# Patient Record
Sex: FEMALE | Race: WHITE | NOT HISPANIC OR LATINO | Employment: UNEMPLOYED | URBAN - NONMETROPOLITAN AREA
[De-identification: names, ages, dates, MRNs, and addresses within clinical notes are randomized per-mention and may not be internally consistent; named-entity substitution may affect disease eponyms.]

---

## 2024-03-06 ENCOUNTER — OUTSIDE PLACE OF SERVICE (OUTPATIENT)
Dept: FAMILY MEDICINE | Facility: CLINIC | Age: 64
End: 2024-03-06
Payer: MEDICAID

## 2024-03-06 PROCEDURE — 99223 1ST HOSP IP/OBS HIGH 75: CPT | Mod: ,,, | Performed by: FAMILY MEDICINE

## 2024-03-07 ENCOUNTER — OUTSIDE PLACE OF SERVICE (OUTPATIENT)
Dept: FAMILY MEDICINE | Facility: CLINIC | Age: 64
End: 2024-03-07
Payer: MEDICAID

## 2024-03-07 ENCOUNTER — HOSPITAL ENCOUNTER (INPATIENT)
Facility: HOSPITAL | Age: 64
LOS: 4 days | Discharge: ANOTHER HOSPITAL | DRG: 644 | End: 2024-03-11
Admitting: FAMILY MEDICINE
Payer: MEDICAID

## 2024-03-07 ENCOUNTER — HOSPITAL ENCOUNTER (OUTPATIENT)
Dept: RADIOLOGY | Facility: HOSPITAL | Age: 64
Discharge: HOME | DRG: 644 | End: 2024-03-07
Attending: PSYCHIATRY & NEUROLOGY
Payer: MEDICAID

## 2024-03-07 DIAGNOSIS — E03.5 MYXEDEMA COMA: Primary | ICD-10-CM

## 2024-03-07 DIAGNOSIS — E87.6 HYPOKALEMIA: ICD-10-CM

## 2024-03-07 DIAGNOSIS — I95.0 IDIOPATHIC HYPOTENSION: ICD-10-CM

## 2024-03-07 DIAGNOSIS — R41.82 ALTERED MENTAL STATUS: ICD-10-CM

## 2024-03-07 DIAGNOSIS — R40.1 STUPOR: ICD-10-CM

## 2024-03-07 DIAGNOSIS — R41.0 CONFUSION: ICD-10-CM

## 2024-03-07 DIAGNOSIS — R00.0 TACHYCARDIA: ICD-10-CM

## 2024-03-07 PROCEDURE — 93005 ELECTROCARDIOGRAM TRACING: CPT

## 2024-03-07 PROCEDURE — 93010 ELECTROCARDIOGRAM REPORT: CPT | Mod: ,,, | Performed by: INTERNAL MEDICINE

## 2024-03-07 PROCEDURE — 25500020 PHARM REV CODE 255

## 2024-03-07 PROCEDURE — 70460 CT HEAD/BRAIN W/DYE: CPT | Mod: TC

## 2024-03-07 PROCEDURE — 99231 SBSQ HOSP IP/OBS SF/LOW 25: CPT | Mod: ,,, | Performed by: FAMILY MEDICINE

## 2024-03-07 PROCEDURE — 20000000 HC ICU ROOM

## 2024-03-07 PROCEDURE — 99285 EMERGENCY DEPT VISIT HI MDM: CPT | Mod: 25

## 2024-03-07 RX ADMIN — IOHEXOL 50 ML: 300 INJECTION, SOLUTION INTRAVENOUS at 03:03

## 2024-03-08 PROBLEM — R40.1 STUPOR: Status: ACTIVE | Noted: 2024-03-08

## 2024-03-08 PROBLEM — E87.6 HYPOKALEMIA: Status: ACTIVE | Noted: 2024-03-08

## 2024-03-08 PROBLEM — I95.0 IDIOPATHIC HYPOTENSION: Status: ACTIVE | Noted: 2024-03-08

## 2024-03-08 PROBLEM — R41.0 CONFUSION: Status: ACTIVE | Noted: 2024-03-08

## 2024-03-08 PROBLEM — E03.5 MYXEDEMA COMA: Status: ACTIVE | Noted: 2024-03-08

## 2024-03-08 LAB
ALBUMIN SERPL-MCNC: 3.3 G/DL (ref 3.4–5)
ALBUMIN SERPL-MCNC: 3.5 G/DL (ref 3.4–5)
ALBUMIN/GLOB SERPL: 0.8 RATIO
ALBUMIN/GLOB SERPL: 0.8 RATIO
ALP SERPL-CCNC: 124 UNIT/L (ref 50–144)
ALP SERPL-CCNC: 142 UNIT/L (ref 50–144)
ALT SERPL-CCNC: 66 UNIT/L (ref 1–45)
ALT SERPL-CCNC: 72 UNIT/L (ref 1–45)
AMPHET UR QL SCN: NEGATIVE
ANION GAP SERPL CALC-SCNC: 3 MEQ/L (ref 2–13)
ANION GAP SERPL CALC-SCNC: 4 MEQ/L (ref 2–13)
APAP SERPL-MCNC: <10 UG/ML (ref 10–30)
APPEARANCE UR: CLEAR
AST SERPL-CCNC: 149 UNIT/L (ref 14–36)
AST SERPL-CCNC: 169 UNIT/L (ref 14–36)
AV INDEX (PROSTH): 0.68
AV MEAN GRADIENT: 6 MMHG
AV PEAK GRADIENT: 11 MMHG
AV VALVE AREA BY VELOCITY RATIO: 2.28 CM²
AV VALVE AREA: 2.49 CM²
AV VELOCITY RATIO: 0.62
BARBITURATE SCN PRESENT UR: NEGATIVE
BASE EXCESS BLD CALC-SCNC: 6.8 MMOL/L (ref -2–2)
BASOPHILS # BLD AUTO: 0.05 X10(3)/MCL (ref 0.01–0.08)
BASOPHILS # BLD AUTO: 0.06 X10(3)/MCL (ref 0.01–0.08)
BASOPHILS NFR BLD AUTO: 0.4 % (ref 0.1–1.2)
BASOPHILS NFR BLD AUTO: 0.5 % (ref 0.1–1.2)
BENZODIAZ UR QL SCN: NEGATIVE
BILIRUB SERPL-MCNC: 0.8 MG/DL (ref 0–1)
BILIRUB SERPL-MCNC: 1.1 MG/DL (ref 0–1)
BILIRUB UR QL STRIP.AUTO: ABNORMAL
BLOOD GAS SAMPLE TYPE (OHS): ABNORMAL
BNP BLD-MCNC: 584 PG/ML (ref 0–124.9)
BSA FOR ECHO PROCEDURE: 2.23 M2
BUN SERPL-MCNC: 15 MG/DL (ref 7–20)
BUN SERPL-MCNC: 16 MG/DL (ref 7–20)
CALCIUM SERPL-MCNC: 8.7 MG/DL (ref 8.4–10.2)
CALCIUM SERPL-MCNC: 8.7 MG/DL (ref 8.4–10.2)
CANNABINOIDS UR QL SCN: NEGATIVE
CHLORIDE SERPL-SCNC: 103 MMOL/L (ref 98–110)
CHLORIDE SERPL-SCNC: 104 MMOL/L (ref 98–110)
CO2 SERPL-SCNC: 35 MMOL/L (ref 21–32)
CO2 SERPL-SCNC: 36 MMOL/L (ref 21–32)
COCAINE UR QL SCN: NEGATIVE
COHGB MFR BLDA: 1.1 % (ref 0–1.5)
COLOR UR AUTO: YELLOW
CORTIS 1H P CHAL SERPL-SCNC: 32.5 UG/DL
CORTIS 30M P CHAL SERPL-SCNC: 28 UG/DL
CORTIS SERPL-SCNC: 17.2 UG/DL
CREAT SERPL-MCNC: 0.75 MG/DL (ref 0.66–1.25)
CREAT SERPL-MCNC: 0.86 MG/DL (ref 0.66–1.25)
CREAT/UREA NIT SERPL: 17 (ref 12–20)
CREAT/UREA NIT SERPL: 21 (ref 12–20)
CV ECHO LV RWT: 0.47 CM
DOP CALC AO PEAK VEL: 1.67 M/S
DOP CALC AO VTI: 29.3 CM
DOP CALC LVOT AREA: 3.7 CM2
DOP CALC LVOT DIAMETER: 2.16 CM
DOP CALC LVOT PEAK VEL: 1.04 M/S
DOP CALC LVOT STROKE VOLUME: 72.88 CM3
DOP CALC MV VTI: 16.7 CM
DOP CALCLVOT PEAK VEL VTI: 19.9 CM
E WAVE DECELERATION TIME: 90 MSEC
E/A RATIO: 0.7
E/E' RATIO: 7.67 M/S
ECHO LV POSTERIOR WALL: 1.17 CM (ref 0.6–1.1)
EOSINOPHIL # BLD AUTO: 0.16 X10(3)/MCL (ref 0.04–0.36)
EOSINOPHIL # BLD AUTO: 0.16 X10(3)/MCL (ref 0.04–0.36)
EOSINOPHIL NFR BLD AUTO: 1.4 % (ref 0.7–7)
EOSINOPHIL NFR BLD AUTO: 1.5 % (ref 0.7–7)
ERYTHROCYTE [DISTWIDTH] IN BLOOD BY AUTOMATED COUNT: 15.3 % (ref 11–14.5)
ERYTHROCYTE [DISTWIDTH] IN BLOOD BY AUTOMATED COUNT: 15.9 % (ref 11–14.5)
ETHANOL BLD-MCNC: <0.01 G/DL
ETHANOL SERPL-MCNC: <10 MG/DL
FRACTIONAL SHORTENING: 28 % (ref 28–44)
GFR SERPLBLD CREATININE-BSD FMLA CKD-EPI: 76 MLS/MIN/1.73/M2
GFR SERPLBLD CREATININE-BSD FMLA CKD-EPI: 90 MLS/MIN/1.73/M2
GLOBULIN SER-MCNC: 4.1 GM/DL (ref 2–3.9)
GLOBULIN SER-MCNC: 4.4 GM/DL (ref 2–3.9)
GLUCOSE SERPL-MCNC: 89 MG/DL (ref 70–115)
GLUCOSE SERPL-MCNC: 93 MG/DL (ref 70–115)
GLUCOSE UR QL STRIP.AUTO: NEGATIVE
HCO3 BLDA-SCNC: 30.6 MMOL/L (ref 22–26)
HCT VFR BLD AUTO: 34.3 % (ref 36–48)
HCT VFR BLD AUTO: 35.9 % (ref 36–48)
HGB BLD-MCNC: 11.6 G/DL (ref 11.8–16)
HGB BLD-MCNC: 11.7 G/DL (ref 11.8–16)
IMM GRANULOCYTES # BLD AUTO: 0.06 X10(3)/MCL (ref 0–0.03)
IMM GRANULOCYTES # BLD AUTO: 0.06 X10(3)/MCL (ref 0–0.03)
IMM GRANULOCYTES NFR BLD AUTO: 0.5 % (ref 0–0.5)
IMM GRANULOCYTES NFR BLD AUTO: 0.5 % (ref 0–0.5)
INHALED O2 CONCENTRATION: 32 %
INTERVENTRICULAR SEPTUM: 1.19 CM (ref 0.6–1.1)
IP (OHS): 0 CMH2O
KETONES UR QL STRIP.AUTO: 15
LACTATE SERPL-SCNC: 1.2 MMOL/L (ref 0.4–2)
LEFT ATRIUM SIZE: 4.01 CM
LEFT INTERNAL DIMENSION IN SYSTOLE: 3.59 CM (ref 2.1–4)
LEFT VENTRICLE DIASTOLIC VOLUME INDEX: 56.04 ML/M2
LEFT VENTRICLE DIASTOLIC VOLUME: 118.8 ML
LEFT VENTRICLE MASS INDEX: 108 G/M2
LEFT VENTRICLE SYSTOLIC VOLUME INDEX: 25.5 ML/M2
LEFT VENTRICLE SYSTOLIC VOLUME: 54.1 ML
LEFT VENTRICULAR INTERNAL DIMENSION IN DIASTOLE: 5.01 CM (ref 3.5–6)
LEFT VENTRICULAR MASS: 229.04 G
LEUKOCYTE ESTERASE UR QL STRIP.AUTO: NEGATIVE
LPM (OHS): 3
LV LATERAL E/E' RATIO: 7.67 M/S
LV SEPTAL E/E' RATIO: 7.67 M/S
LVOT MG: 3.1 MMHG
LVOT MV: 0.87 CM/S
LYMPHOCYTES # BLD AUTO: 2.47 X10(3)/MCL (ref 1.16–3.74)
LYMPHOCYTES # BLD AUTO: 2.84 X10(3)/MCL (ref 1.16–3.74)
LYMPHOCYTES NFR BLD AUTO: 22.6 % (ref 20–55)
LYMPHOCYTES NFR BLD AUTO: 25.4 % (ref 20–55)
MAGNESIUM SERPL-MCNC: 2.2 MG/DL (ref 1.8–2.4)
MCH RBC QN AUTO: 31.4 PG (ref 27–34)
MCH RBC QN AUTO: 32.6 PG (ref 27–34)
MCHC RBC AUTO-ENTMCNC: 32.3 G/DL (ref 31–37)
MCHC RBC AUTO-ENTMCNC: 34.1 G/DL (ref 31–37)
MCV RBC AUTO: 95.5 FL (ref 79–99)
MCV RBC AUTO: 97.3 FL (ref 79–99)
METHADONE UR QL SCN: NEGATIVE
METHGB MFR BLDA: 0.8 % (ref 0–1.5)
MONOCYTES # BLD AUTO: 1.09 X10(3)/MCL (ref 0.24–0.36)
MONOCYTES # BLD AUTO: 1.25 X10(3)/MCL (ref 0.24–0.36)
MONOCYTES NFR BLD AUTO: 11.5 % (ref 4.7–12.5)
MONOCYTES NFR BLD AUTO: 9.7 % (ref 4.7–12.5)
MV MEAN GRADIENT: 4 MMHG
MV PEAK A VEL: 0.98 M/S
MV PEAK E VEL: 0.69 M/S
MV PEAK GRADIENT: 8 MMHG
MV VALVE AREA BY CONTINUITY EQUATION: 4.36 CM2
NEUTROPHILS # BLD AUTO: 6.91 X10(3)/MCL (ref 1.56–6.13)
NEUTROPHILS # BLD AUTO: 7 X10(3)/MCL (ref 1.56–6.13)
NEUTROPHILS NFR BLD AUTO: 62.6 % (ref 37–73)
NEUTROPHILS NFR BLD AUTO: 63.4 % (ref 37–73)
NITRITE UR QL STRIP.AUTO: NEGATIVE
NRBC BLD AUTO-RTO: 0 %
NRBC BLD AUTO-RTO: 0 %
OHS QRS DURATION: 102 MS
OHS QTC CALCULATION: 510 MS
OPIATES UR QL SCN: POSITIVE
PAW @ PEAK INSP FLOW SETTING VENT: 0 CMH20
PCO2 BLDA: 51.2 MMHG (ref 35–45)
PCP UR QL: NEGATIVE
PH BLDA: 7.41 [PH] (ref 7.35–7.45)
PH UR STRIP.AUTO: 7 [PH]
PH UR: 7 [PH] (ref 3–11)
PISA TR MAX VEL: 2.99 M/S
PLATELET # BLD AUTO: 286 X10(3)/MCL (ref 140–371)
PLATELET # BLD AUTO: 290 X10(3)/MCL (ref 140–371)
PMV BLD AUTO: 10.5 FL (ref 9.4–12.4)
PMV BLD AUTO: 11.1 FL (ref 9.4–12.4)
PO2 BLDA: 71.5 MMHG (ref 80–105)
POTASSIUM SERPL-SCNC: 3.2 MMOL/L (ref 3.5–5.1)
POTASSIUM SERPL-SCNC: 4.3 MMOL/L (ref 3.5–5.1)
PROT SERPL-MCNC: 7.4 GM/DL (ref 6.3–8.2)
PROT SERPL-MCNC: 7.9 GM/DL (ref 6.3–8.2)
PROT UR QL STRIP.AUTO: NEGATIVE
RA PRESSURE ESTIMATED: 3 MMHG
RBC # BLD AUTO: 3.59 X10(6)/MCL (ref 4–5.1)
RBC # BLD AUTO: 3.69 X10(6)/MCL (ref 4–5.1)
RBC UR QL AUTO: NEGATIVE
RV TB RVSP: 6 MMHG
SALICYLATES SERPL-MCNC: <1 MG/DL
SAO2 % BLDA: 94.7 % (ref 95–100)
SODIUM SERPL-SCNC: 142 MMOL/L (ref 135–145)
SODIUM SERPL-SCNC: 143 MMOL/L (ref 135–145)
SP GR UR STRIP.AUTO: <=1.005 (ref 1–1.03)
T3FREE SERPL-MCNC: <1.5 PG/ML (ref 1.58–3.91)
T3FREE SERPL-MCNC: <1.5 PG/ML (ref 1.58–3.91)
T4 FREE SERPL-MCNC: 1.45 NG/DL (ref 0.78–2.19)
T4 FREE SERPL-MCNC: 2.55 NG/DL (ref 0.78–2.19)
TDI LATERAL: 0.09 M/S
TDI SEPTAL: 0.09 M/S
TDI: 0.09 M/S
TR MAX PG: 36 MMHG
TRICUSPID ANNULAR PLANE SYSTOLIC EXCURSION: 1.8 CM
TROPONIN I SERPL-MCNC: <0.012 NG/ML (ref 0–0.03)
TSH SERPL-ACNC: 21 UIU/ML (ref 0.36–3.74)
TSH SERPL-ACNC: 23.8 UIU/ML (ref 0.36–3.74)
TV REST PULMONARY ARTERY PRESSURE: 39 MMHG
UROBILINOGEN UR STRIP-ACNC: 2
WBC # SPEC AUTO: 10.91 X10(3)/MCL (ref 4–11.5)
WBC # SPEC AUTO: 11.2 X10(3)/MCL (ref 4–11.5)
Z-SCORE OF LEFT VENTRICULAR DIMENSION IN END DIASTOLE: -2.91
Z-SCORE OF LEFT VENTRICULAR DIMENSION IN END SYSTOLE: -1.03

## 2024-03-08 PROCEDURE — 80179 DRUG ASSAY SALICYLATE: CPT

## 2024-03-08 PROCEDURE — 83735 ASSAY OF MAGNESIUM: CPT

## 2024-03-08 PROCEDURE — 82533 TOTAL CORTISOL: CPT | Performed by: INTERNAL MEDICINE

## 2024-03-08 PROCEDURE — 84439 ASSAY OF FREE THYROXINE: CPT

## 2024-03-08 PROCEDURE — 36600 WITHDRAWAL OF ARTERIAL BLOOD: CPT

## 2024-03-08 PROCEDURE — 25000003 PHARM REV CODE 250: Performed by: INTERNAL MEDICINE

## 2024-03-08 PROCEDURE — 94761 N-INVAS EAR/PLS OXIMETRY MLT: CPT | Mod: XB

## 2024-03-08 PROCEDURE — 36415 COLL VENOUS BLD VENIPUNCTURE: CPT

## 2024-03-08 PROCEDURE — 84484 ASSAY OF TROPONIN QUANT: CPT

## 2024-03-08 PROCEDURE — 99900035 HC TECH TIME PER 15 MIN (STAT)

## 2024-03-08 PROCEDURE — 63600175 PHARM REV CODE 636 W HCPCS: Performed by: FAMILY MEDICINE

## 2024-03-08 PROCEDURE — 80307 DRUG TEST PRSMV CHEM ANLYZR: CPT

## 2024-03-08 PROCEDURE — 82077 ASSAY SPEC XCP UR&BREATH IA: CPT

## 2024-03-08 PROCEDURE — 82390 ASSAY OF CERULOPLASMIN: CPT | Performed by: INTERNAL MEDICINE

## 2024-03-08 PROCEDURE — 83605 ASSAY OF LACTIC ACID: CPT

## 2024-03-08 PROCEDURE — 51702 INSERT TEMP BLADDER CATH: CPT

## 2024-03-08 PROCEDURE — 63600175 PHARM REV CODE 636 W HCPCS: Performed by: INTERNAL MEDICINE

## 2024-03-08 PROCEDURE — 84439 ASSAY OF FREE THYROXINE: CPT | Performed by: INTERNAL MEDICINE

## 2024-03-08 PROCEDURE — 81003 URINALYSIS AUTO W/O SCOPE: CPT | Mod: 59

## 2024-03-08 PROCEDURE — 85025 COMPLETE CBC W/AUTO DIFF WBC: CPT

## 2024-03-08 PROCEDURE — 84481 FREE ASSAY (FT-3): CPT | Performed by: INTERNAL MEDICINE

## 2024-03-08 PROCEDURE — 82533 TOTAL CORTISOL: CPT | Performed by: FAMILY MEDICINE

## 2024-03-08 PROCEDURE — 25000003 PHARM REV CODE 250: Performed by: FAMILY MEDICINE

## 2024-03-08 PROCEDURE — 25000003 PHARM REV CODE 250

## 2024-03-08 PROCEDURE — 80143 DRUG ASSAY ACETAMINOPHEN: CPT

## 2024-03-08 PROCEDURE — 20000000 HC ICU ROOM

## 2024-03-08 PROCEDURE — 80053 COMPREHEN METABOLIC PANEL: CPT | Performed by: INTERNAL MEDICINE

## 2024-03-08 PROCEDURE — 87040 BLOOD CULTURE FOR BACTERIA: CPT

## 2024-03-08 PROCEDURE — 96374 THER/PROPH/DIAG INJ IV PUSH: CPT

## 2024-03-08 PROCEDURE — 84443 ASSAY THYROID STIM HORMONE: CPT

## 2024-03-08 PROCEDURE — 83880 ASSAY OF NATRIURETIC PEPTIDE: CPT

## 2024-03-08 PROCEDURE — 84443 ASSAY THYROID STIM HORMONE: CPT | Performed by: INTERNAL MEDICINE

## 2024-03-08 PROCEDURE — 63600175 PHARM REV CODE 636 W HCPCS

## 2024-03-08 PROCEDURE — 84481 FREE ASSAY (FT-3): CPT

## 2024-03-08 PROCEDURE — 82803 BLOOD GASES ANY COMBINATION: CPT

## 2024-03-08 PROCEDURE — 27000221 HC OXYGEN, UP TO 24 HOURS

## 2024-03-08 PROCEDURE — 80053 COMPREHEN METABOLIC PANEL: CPT

## 2024-03-08 PROCEDURE — 85025 COMPLETE CBC W/AUTO DIFF WBC: CPT | Performed by: INTERNAL MEDICINE

## 2024-03-08 RX ORDER — MELATONIN 3 MG
3 CAPSULE ORAL NIGHTLY
COMMUNITY

## 2024-03-08 RX ORDER — TIZANIDINE HYDROCHLORIDE 4 MG/1
4 CAPSULE, GELATIN COATED ORAL 3 TIMES DAILY
COMMUNITY

## 2024-03-08 RX ORDER — ONDANSETRON HYDROCHLORIDE 2 MG/ML
4 INJECTION, SOLUTION INTRAVENOUS EVERY 8 HOURS PRN
Status: DISCONTINUED | OUTPATIENT
Start: 2024-03-08 | End: 2024-03-11 | Stop reason: HOSPADM

## 2024-03-08 RX ORDER — OXYCODONE HYDROCHLORIDE 5 MG/1
5 TABLET ORAL EVERY 4 HOURS PRN
Status: DISCONTINUED | OUTPATIENT
Start: 2024-03-08 | End: 2024-03-08

## 2024-03-08 RX ORDER — MIDODRINE HYDROCHLORIDE 5 MG/1
5 TABLET ORAL 3 TIMES DAILY
Status: DISCONTINUED | OUTPATIENT
Start: 2024-03-08 | End: 2024-03-08

## 2024-03-08 RX ORDER — LEVOTHYROXINE SODIUM 125 UG/1
125 TABLET ORAL
Status: DISCONTINUED | OUTPATIENT
Start: 2024-03-08 | End: 2024-03-08

## 2024-03-08 RX ORDER — LEVOTHYROXINE SODIUM ANHYDROUS 100 UG/5ML
100 INJECTION, POWDER, LYOPHILIZED, FOR SOLUTION INTRAVENOUS DAILY
Status: DISCONTINUED | OUTPATIENT
Start: 2024-03-08 | End: 2024-03-09

## 2024-03-08 RX ORDER — COSYNTROPIN 0.25 MG/ML
0.25 INJECTION, POWDER, FOR SOLUTION INTRAMUSCULAR; INTRAVENOUS ONCE
Status: DISCONTINUED | OUTPATIENT
Start: 2024-03-08 | End: 2024-03-08 | Stop reason: SDUPTHER

## 2024-03-08 RX ORDER — DIPHENHYDRAMINE HYDROCHLORIDE 50 MG/ML
25 INJECTION INTRAMUSCULAR; INTRAVENOUS EVERY 6 HOURS PRN
Status: DISCONTINUED | OUTPATIENT
Start: 2024-03-08 | End: 2024-03-11 | Stop reason: HOSPADM

## 2024-03-08 RX ORDER — MORPHINE SULFATE 60 MG/1
60 TABLET, FILM COATED, EXTENDED RELEASE ORAL 2 TIMES DAILY
COMMUNITY

## 2024-03-08 RX ORDER — LEVOTHYROXINE SODIUM ANHYDROUS 100 UG/5ML
200 INJECTION, POWDER, LYOPHILIZED, FOR SOLUTION INTRAVENOUS ONCE
Status: COMPLETED | OUTPATIENT
Start: 2024-03-08 | End: 2024-03-08

## 2024-03-08 RX ORDER — ACETAZOLAMIDE 250 MG/1
250 TABLET ORAL DAILY
Status: DISCONTINUED | OUTPATIENT
Start: 2024-03-08 | End: 2024-03-11 | Stop reason: HOSPADM

## 2024-03-08 RX ORDER — MINOCYCLINE HYDROCHLORIDE 100 MG/1
100 TABLET ORAL
COMMUNITY

## 2024-03-08 RX ORDER — TRIAMCINOLONE ACETONIDE 1 MG/ML
1 LOTION TOPICAL 2 TIMES DAILY
COMMUNITY

## 2024-03-08 RX ORDER — COSYNTROPIN 0.25 MG/ML
0.25 INJECTION, POWDER, FOR SOLUTION INTRAMUSCULAR; INTRAVENOUS ONCE
Status: COMPLETED | OUTPATIENT
Start: 2024-03-08 | End: 2024-03-08

## 2024-03-08 RX ORDER — MORPHINE SULFATE 2 MG/ML
2 INJECTION, SOLUTION INTRAMUSCULAR; INTRAVENOUS EVERY 6 HOURS PRN
Status: DISCONTINUED | OUTPATIENT
Start: 2024-03-08 | End: 2024-03-11 | Stop reason: HOSPADM

## 2024-03-08 RX ORDER — ACETAZOLAMIDE 250 MG/1
250 TABLET ORAL DAILY
COMMUNITY

## 2024-03-08 RX ORDER — LORAZEPAM 2 MG/ML
2 INJECTION INTRAMUSCULAR EVERY 4 HOURS PRN
Status: DISCONTINUED | OUTPATIENT
Start: 2024-03-08 | End: 2024-03-11 | Stop reason: HOSPADM

## 2024-03-08 RX ORDER — FLUOCINONIDE TOPICAL SOLUTION USP, 0.05% 0.5 MG/ML
1 SOLUTION TOPICAL 2 TIMES DAILY
COMMUNITY

## 2024-03-08 RX ORDER — SODIUM CHLORIDE 0.9 % (FLUSH) 0.9 %
10 SYRINGE (ML) INJECTION
Status: DISCONTINUED | OUTPATIENT
Start: 2024-03-08 | End: 2024-03-11 | Stop reason: HOSPADM

## 2024-03-08 RX ORDER — DULOXETIN HYDROCHLORIDE 30 MG/1
60 CAPSULE, DELAYED RELEASE ORAL DAILY
Status: DISCONTINUED | OUTPATIENT
Start: 2024-03-08 | End: 2024-03-11 | Stop reason: HOSPADM

## 2024-03-08 RX ORDER — MIDODRINE HYDROCHLORIDE 5 MG/1
5 TABLET ORAL 3 TIMES DAILY
COMMUNITY
End: 2024-03-08 | Stop reason: CLARIF

## 2024-03-08 RX ORDER — OXYCODONE HYDROCHLORIDE 5 MG/1
20 CAPSULE ORAL EVERY 4 HOURS PRN
COMMUNITY

## 2024-03-08 RX ORDER — PANTOPRAZOLE SODIUM 40 MG/1
40 TABLET, DELAYED RELEASE ORAL DAILY
Status: DISCONTINUED | OUTPATIENT
Start: 2024-03-08 | End: 2024-03-10

## 2024-03-08 RX ORDER — ENOXAPARIN SODIUM 100 MG/ML
40 INJECTION SUBCUTANEOUS EVERY 24 HOURS
Status: DISCONTINUED | OUTPATIENT
Start: 2024-03-08 | End: 2024-03-11 | Stop reason: HOSPADM

## 2024-03-08 RX ORDER — MUPIROCIN 20 MG/G
OINTMENT TOPICAL 2 TIMES DAILY
Status: DISCONTINUED | OUTPATIENT
Start: 2024-03-08 | End: 2024-03-11 | Stop reason: HOSPADM

## 2024-03-08 RX ORDER — DULOXETIN HYDROCHLORIDE 60 MG/1
60 CAPSULE, DELAYED RELEASE ORAL DAILY
COMMUNITY

## 2024-03-08 RX ORDER — POTASSIUM CHLORIDE 7.45 MG/ML
10 INJECTION INTRAVENOUS
Status: COMPLETED | OUTPATIENT
Start: 2024-03-08 | End: 2024-03-08

## 2024-03-08 RX ORDER — OXYCODONE HYDROCHLORIDE 5 MG/1
5 TABLET ORAL EVERY 4 HOURS PRN
Status: DISCONTINUED | OUTPATIENT
Start: 2024-03-08 | End: 2024-03-11 | Stop reason: HOSPADM

## 2024-03-08 RX ORDER — OLANZAPINE 5 MG/1
5 TABLET ORAL NIGHTLY
COMMUNITY

## 2024-03-08 RX ORDER — SODIUM CHLORIDE 9 MG/ML
INJECTION, SOLUTION INTRAVENOUS CONTINUOUS
Status: DISCONTINUED | OUTPATIENT
Start: 2024-03-08 | End: 2024-03-09

## 2024-03-08 RX ORDER — HALOPERIDOL 5 MG/ML
10 INJECTION INTRAMUSCULAR ONCE
Status: COMPLETED | OUTPATIENT
Start: 2024-03-08 | End: 2024-03-08

## 2024-03-08 RX ORDER — LEVOTHYROXINE SODIUM 125 UG/1
125 TABLET ORAL
COMMUNITY

## 2024-03-08 RX ORDER — MORPHINE SULFATE 15 MG/1
60 TABLET, FILM COATED, EXTENDED RELEASE ORAL EVERY 12 HOURS
Status: DISCONTINUED | OUTPATIENT
Start: 2024-03-08 | End: 2024-03-11 | Stop reason: HOSPADM

## 2024-03-08 RX ORDER — LEVOTHYROXINE SODIUM ANHYDROUS 100 UG/5ML
175 INJECTION, POWDER, LYOPHILIZED, FOR SOLUTION INTRAVENOUS DAILY
Status: DISCONTINUED | OUTPATIENT
Start: 2024-03-08 | End: 2024-03-08

## 2024-03-08 RX ORDER — METRONIDAZOLE 500 MG/100ML
500 INJECTION, SOLUTION INTRAVENOUS
Status: DISCONTINUED | OUTPATIENT
Start: 2024-03-08 | End: 2024-03-11 | Stop reason: HOSPADM

## 2024-03-08 RX ORDER — MORPHINE SULFATE 2 MG/ML
2 INJECTION, SOLUTION INTRAMUSCULAR; INTRAVENOUS EVERY 6 HOURS PRN
Status: DISCONTINUED | OUTPATIENT
Start: 2024-03-08 | End: 2024-03-08

## 2024-03-08 RX ORDER — FUROSEMIDE 40 MG/1
40 TABLET ORAL 2 TIMES DAILY
COMMUNITY

## 2024-03-08 RX ORDER — PANTOPRAZOLE SODIUM 40 MG/1
40 TABLET, DELAYED RELEASE ORAL DAILY
COMMUNITY

## 2024-03-08 RX ORDER — MINOCYCLINE HYDROCHLORIDE 100 MG/1
100 CAPSULE ORAL EVERY 12 HOURS
Status: DISCONTINUED | OUTPATIENT
Start: 2024-03-08 | End: 2024-03-11 | Stop reason: HOSPADM

## 2024-03-08 RX ADMIN — LEVOTHYROXINE SODIUM ANHYDROUS 200 MCG: 100 INJECTION, POWDER, LYOPHILIZED, FOR SOLUTION INTRAVENOUS at 02:03

## 2024-03-08 RX ADMIN — MUPIROCIN: 20 OINTMENT TOPICAL at 08:03

## 2024-03-08 RX ADMIN — MORPHINE SULFATE 2 MG: 2 INJECTION, SOLUTION INTRAMUSCULAR; INTRAVENOUS at 11:03

## 2024-03-08 RX ADMIN — ACETAZOLAMIDE 250 MG: 250 TABLET ORAL at 08:03

## 2024-03-08 RX ADMIN — HALOPERIDOL LACTATE 10 MG: 5 INJECTION, SOLUTION INTRAMUSCULAR at 08:03

## 2024-03-08 RX ADMIN — SODIUM CHLORIDE: 9 INJECTION, SOLUTION INTRAVENOUS at 09:03

## 2024-03-08 RX ADMIN — DULOXETINE HYDROCHLORIDE 60 MG: 30 CAPSULE, DELAYED RELEASE ORAL at 08:03

## 2024-03-08 RX ADMIN — LORAZEPAM 2 MG: 2 INJECTION INTRAMUSCULAR; INTRAVENOUS at 03:03

## 2024-03-08 RX ADMIN — MIDODRINE HYDROCHLORIDE 5 MG: 5 TABLET ORAL at 08:03

## 2024-03-08 RX ADMIN — METRONIDAZOLE 500 MG: 5 INJECTION, SOLUTION INTRAVENOUS at 03:03

## 2024-03-08 RX ADMIN — COSYNTROPIN 0.25 MG: 0.25 INJECTION, POWDER, LYOPHILIZED, FOR SOLUTION INTRAVENOUS at 07:03

## 2024-03-08 RX ADMIN — HYDROCORTISONE SODIUM SUCCINATE 100 MG: 100 INJECTION, POWDER, FOR SOLUTION INTRAMUSCULAR; INTRAVENOUS at 08:03

## 2024-03-08 RX ADMIN — MINOCYCLINE HYDROCHLORIDE 100 MG: 100 CAPSULE ORAL at 08:03

## 2024-03-08 RX ADMIN — POTASSIUM CHLORIDE 10 MEQ: 7.46 INJECTION, SOLUTION INTRAVENOUS at 01:03

## 2024-03-08 RX ADMIN — PANTOPRAZOLE SODIUM 40 MG: 40 TABLET, DELAYED RELEASE ORAL at 08:03

## 2024-03-08 RX ADMIN — LEVOTHYROXINE SODIUM ANHYDROUS 100 MCG: 100 INJECTION, POWDER, LYOPHILIZED, FOR SOLUTION INTRAVENOUS at 11:03

## 2024-03-08 RX ADMIN — DIPHENHYDRAMINE HYDROCHLORIDE 25 MG: 50 INJECTION INTRAMUSCULAR; INTRAVENOUS at 10:03

## 2024-03-08 RX ADMIN — WHITE PETROLATUM: 1.75 OINTMENT TOPICAL at 08:03

## 2024-03-08 RX ADMIN — METRONIDAZOLE 500 MG: 5 INJECTION, SOLUTION INTRAVENOUS at 10:03

## 2024-03-08 RX ADMIN — MORPHINE SULFATE 60 MG: 15 TABLET, FILM COATED, EXTENDED RELEASE ORAL at 08:03

## 2024-03-08 RX ADMIN — ENOXAPARIN SODIUM 40 MG: 40 INJECTION SUBCUTANEOUS at 05:03

## 2024-03-08 RX ADMIN — DEXTROSE MONOHYDRATE 2 G: 5 INJECTION INTRAVENOUS at 05:03

## 2024-03-08 RX ADMIN — OXYCODONE HYDROCHLORIDE 5 MG: 5 TABLET ORAL at 04:03

## 2024-03-08 NOTE — PLAN OF CARE
03/08/24 0911   Discharge Assessment   Assessment Type Discharge Planning Assessment   Confirmed/corrected address, phone number and insurance Yes   Confirmed Demographics Correct on Facesheet   Source of Information health record   Communicated AMANUEL with patient/caregiver Date not available/Unable to determine   Reason For Admission Myxedema Coma   People in Home facility resident   Facility Arrived From: [facility]Resident of [facility]   Do you expect to return to your current living situation? Yes   Prior to hospitilization cognitive status: Inappropriate Behavior;Not Oriented to Place;Not Oriented to Time   Current cognitive status: Not Oriented to Place;Not Oriented to Time;Inappropriate Behavior   Walking or Climbing Stairs Difficulty yes   Walking or Climbing Stairs ambulation difficulty, dependent;transferring difficulty, dependent   Mobility Management Wheelchair   Dressing/Bathing Difficulty yes   Dressing/Bathing dressing difficulty, dependent;bathing difficulty, dependent   Dressing/Bathing Management [facility] staff   Equipment Currently Used at Home hospital bed;lift device   Readmission within 30 days? No   Do you take prescription medications? Yes   Do you have prescription coverage? Yes   Coverage Medicaid   Do you have any problems affording any of your prescribed medications? No   Is the patient taking medications as prescribed? yes   Who is going to help you get home at discharge? [facility] transportation   How do you get to doctors appointments? other (see comments)  ([facility] Transportation)   Are you on dialysis? No   Do you take coumadin? No   Discharge Plan A Return to nursing home   DME Needed Upon Discharge  none   Transition of Care Barriers None   Physical Activity   On average, how many days per week do you engage in moderate to strenuous exercise (like a brisk walk)? 0 days   On average, how many minutes do you engage in exercise at this level? 0 min   Financial Resource Strain    How hard is it for you to pay for the very basics like food, housing, medical care, and heating? Not hard   Housing Stability   In the last 12 months, was there a time when you were not able to pay the mortgage or rent on time? N   In the last 12 months, how many places have you lived? 1   In the last 12 months, was there a time when you did not have a steady place to sleep or slept in a shelter (including now)? N   Transportation Needs   In the past 12 months, has lack of transportation kept you from medical appointments or from getting medications? no   In the past 12 months, has lack of transportation kept you from meetings, work, or from getting things needed for daily living? No   Food Insecurity   Within the past 12 months, you worried that your food would run out before you got the money to buy more. Never true   Within the past 12 months, the food you bought just didn't last and you didn't have money to get more. Never true   Stress   Do you feel stress - tense, restless, nervous, or anxious, or unable to sleep at night because your mind is troubled all the time - these days? To some exte   Social Connections   In a typical week, how many times do you talk on the phone with family, friends, or neighbors? More than 3   How often do you get together with friends or relatives? More than 3   How often do you attend Bahai or Scientologist services? Pt Unable   Do you belong to any clubs or organizations such as Bahai groups, unions, fraternal or athletic groups, or school groups? No   How often do you attend meetings of the clubs or organizations you belong to? Never   Are you , , , , never , or living with a partner? Pt Unable   Alcohol Use   Q1: How often do you have a drink containing alcohol? Never   Q2: How many drinks containing alcohol do you have on a typical day when you are drinking? None   Q3: How often do you have six or more drinks on one occasion? Never

## 2024-03-08 NOTE — ED NOTES
Tiffany Webber  : 1960  MRN: [id number]  ConnectID: [id number]  REASON:  Admit: ICU  ACUITY:  10 Minutes  SUBMITTED:  2024 01:40 CST

## 2024-03-08 NOTE — H&P
[facility]    History & Physical      Patient Name: Tiffany Webber  MRN: [id number]  Admission Date: 3/7/2024  Attending Physician: Pradip Matthew MD   Primary Care Provider: Magi Primary Doctor         Patient information was obtained from patient and ER records.     Subjective:     Principal Problem:Myxedema coma    Chief Complaint: Encephalopathy  Chief Complaint   Patient presents with    Fatigue     Wheeled in stretcher from [facility] with c/o lethargy and arouses to sternal rub per nurse    Low O2 sat     Nurse reported SPO2 sat at 83% on RA and 93% with 3L NC. Also reported moments of apnea        HPI: The patient is a 63-year-old female sent from [facility] for altered mental status for the past two days. The patient is completely altered and jittery with involuntary shaking movements and is unable to give any history. She does not answer questions appropriately. The [facility] ordered a head CT on arrival which was negative. Her TSH is found to be 24,000 here. T4 is 1.45. T3 is unavailable because these are sent out. She was started on Zyprexa apparently due to hallucinations at the psychiatric facility. She does have significant edema in the bilateral lower extremities. 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 She was given IV levothyroxine in the emergency department.    Past Medical History:   Diagnosis Date    Depression     Diabetes mellitus     Hypertension     Hypotension     Hypothyroidism, unspecified     Unspecified viral hepatitis C without hepatic coma        Past Surgical History:   Procedure Laterality Date    THORACIC SPINE SURGERY      TOTAL KNEE ARTHROPLASTY Left        Review of patient's allergies indicates:   Allergen Reactions    Augmentin [amoxicillin-pot clavulanate]        Current Facility-Administered Medications on File Prior to Encounter   Medication    [COMPLETED] iohexoL (OMNIPAQUE 300) injection 50 mL     Current Outpatient  Medications on File Prior to Encounter   Medication Sig    acetaZOLAMIDE (DIAMOX) 250 MG tablet Take 250 mg by mouth once daily.    DULoxetine (CYMBALTA) 60 MG capsule Take 60 mg by mouth once daily.    fluocinonide (LIDEX) 0.05 % external solution Apply 1 Application topically 2 (two) times daily. To (L) outer knee    furosemide (LASIX) 40 MG tablet Take 40 mg by mouth 2 (two) times daily. @ 0800 and @ 1600    levothyroxine (SYNTHROID) 125 MCG tablet Take 125 mcg by mouth before breakfast.    melatonin 3 mg Cap Take 3 mg by mouth every evening.    midodrine (PROAMATINE) 5 MG Tab Take 5 mg by mouth 3 (three) times daily.    minocycline (DYNACIN) 100 MG tablet Take 100 mg by mouth every 12 (twelve) hours.    morphine (MS CONTIN) 60 MG 12 hr tablet Take 60 mg by mouth 2 (two) times daily.    OLANZapine (ZYPREXA) 5 MG tablet Take 5 mg by mouth every evening.    oxyCODONE (OXY-IR) 5 mg Cap Take 20 mg by mouth every 4 (four) hours as needed for Pain.    pantoprazole (PROTONIX) 40 MG tablet Take 40 mg by mouth once daily.    tiZANidine 4 mg Cap Take 4 mg by mouth 3 (three) times daily.    triamcinolone acetonide 0.1% (KENALOG) 0.1 % Lotn Apply 1 each topically 2 (two) times daily. Arms and right thigh for excoriation     Family History    None       Tobacco Use    Smoking status: Not on file    Smokeless tobacco: Not on file   Substance and Sexual Activity    Alcohol use: Not on file    Drug use: Not on file    Sexual activity: Not on file     Review of Systems 10 point review of systems is performed and is otherwise negative unless stated as above.  Objective:     Vital Signs (Most Recent):  Temp: 98.4 °F (36.9 °C) (03/07/24 2347)  Pulse: 90 (03/08/24 0132)  Resp: 13 (03/08/24 0132)  BP: 91/60 (03/08/24 0132)  SpO2: 95 % (03/08/24 0132) Vital Signs (24h Range):  Temp:  [98.4 °F (36.9 °C)] 98.4 °F (36.9 °C)  Pulse:  [] 90  Resp:  [11-28] 13  SpO2:  [89 %-96 %] 95 %  BP: ()/(60-98) 91/60     Weight: 110 kg (242  "lb 8.1 oz)  Body mass index is 41.63 kg/m².    Physical Exam   BP 91/60   Pulse 90   Temp 98.4 °F (36.9 °C) (Axillary)   Resp 13   Ht 5' 4" (1.626 m)   Wt 110 kg (242 lb 8.1 oz)   SpO2 95%   BMI 41.63 kg/m²     General Appearance:  Acute altered. Rapid shaking movements involuntary.    Head:  Normocephalic, without obvious abnormality, atraumatic   Eyes:  PERRL, conjunctiva/corneas clear, EOM's intact, fundi benign, both eyes   Ears:  Normal TM's and external ear canals, both ears   Nose: Nares normal, septum midline,mucosa normal, no drainage or sinus tenderness   Throat: Lips, mucosa, and tongue normal; teeth and gums normal   Neck: Supple, symmetrical, trachea midline, no adenopathy;  thyroid: not enlarged, symmetric, no tenderness/mass/nodules; no carotid bruit or JVD   Back:   Symmetric, no curvature, ROM normal, no CVA tenderness   Lungs:   Clear to auscultation bilaterally, respirations unlabored   Breasts:  No masses or tenderness   Heart:  Regular rate and rhythm, S1 and S2 normal, no murmur, rub, or gallop   Abdomen:   Soft, non-tender, bowel sounds active all four quadrants,  no masses, no organomegaly   Pelvic: Deferred   Extremities: Extremities normal, atraumatic, no cyanosis or edema   Pulses: 2+ and symmetric   Skin: Skin color, texture, turgor normal, no rashes or lesions   Lymph nodes: Cervical, supraclavicular, and axillary nodes normal   Neurologic: Unable to fully assess.         Significant Labs: All pertinent labs within the past 24 hours have been reviewed.  Recent Lab Results         03/08/24  0116   03/08/24  0029   03/08/24  0001        Phencyclidine     Negative       Albumin/Globulin Ratio   0.8         Acetaminophen Level   <10.0         Albumin   3.3         Alcohol, Serum   <10.0  Comment: This assay is performed for medical purposes only.         Alcohol, Legal Level   <0.010         ALP   142         ALT   66         Amphetamines, Urine     Negative       Anion Gap   3.0      "    Appearance, UA     Clear       AST   149         Barbituates, Urine     Negative       Baso #   0.05         Basophil %   0.4         Benzodiazepine, Urine     Negative       BILIRUBIN TOTAL   0.8         Bilirubin, UA     Moderate       BUN   15.0         BUN/CREAT RATIO   17         Calcium   8.7         Cannabinoids, Urine     Negative       Chloride   104         CO2   36         Cocaine, Urine     Negative       Color, UA     Yellow       Creatinine   0.86         eGFR   76  Comment:                      EGFR INTERPRETATION    Beginning 8/15/22 we are reporting the eGFRcr calculation as recommended by the National Kidney Foundation. The eGFRcr equation has similar overall performance characteristics to the older equation, but the values may differ by more than 10% particularly at higher values of eGFRcr and younger adult ages.    NKF stages of chronic kidney disease (CKD)  Stage 1: Kidney damage with normal or increased eGFR (>90 mL/min/1.73 m^2)  Stage 2: Mild reduction in GFR (60-89 mL/min/1.73 m^2)  Stage 3a: Moderate reduction in GFR (45-59 mL/min/1.73 m^2)  Stage 3b: Moderate reduction in GFR (30-44 mL/min/1.73 m^2)  Stage 4: Severe reduction in GFR (15-29 mL/min/1.73 m^2)  Stage 5: Kidney failure (GFR <15 mL/min/1.73 m^2)             Eos #   0.16         Eos %   1.4         FIO2, Blood gas     32.0       Free T4   1.45         Globulin, Total   4.1         Glucose   93         Glucose, UA     Negative       Hematocrit   34.3         Hemoglobin   11.7         Immature Grans (Abs)   0.06         Immature Granulocytes   0.5         IP     0.0       Ketones, UA     15       Lactic Acid Level 1.2           Leukocyte Esterase, UA     Negative       LPM     3.0       Lymph #   2.84         LYMPH %   25.4         Magnesium    2.20         MCH   32.6         MCHC   34.1         MCV   95.5         Methadone, Urine     Negative       Mono #   1.09         Mono %   9.7         MPV   10.5         Neut #   7.00       "   Neut %   62.6         NITRITE UA     Negative       nRBC   0.0         Blood, UA     Negative       Opiates, Urine     Positive       pH, UA     7.0       pH, Urine     7.0       PIP     0       Platelet Count   286         Base Excess, Blood gas     6.80       CO Hgb     1.1       POC HCO3     30.6       Met Hgb     0.8       POC PCO2     51.2       POC PH     7.413       POC PO2     71.5       Potassium   3.2         ProBNP   584.0  Comment:   For ambulatory patients presenting to outpatient facilities with clinical suspicion of HF not previously diagnosed and at least one sign, symptom or risk factor for HF, NT-proBNP II test results should be interpreted as indicated below:    <125(pg/mL):"Negative: Heart Failure Unlikely"    >=125(pg/mL):"Consider Heart Failure as well as other causes of NT-proBNP elevation"               PROTEIN TOTAL   7.4         Protein, UA     Negative       RBC   3.59         RDW   15.3         Salicylate Level   <1.0         Sample Type     Arterial Blood       sO2, Blood gas     94.7       Sodium   143         Specific Gravity,UA     <=1.005       Troponin I   <0.012         TSH   23.800         Urobilinogen, UA     2.0       WBC   11.20                 Significant Imaging: I have reviewed all pertinent imaging results/findings within the past 24 hours.  I have reviewed and interpreted all pertinent imaging results/findings within the past 24 hours.    Assessment/Plan:     Active Diagnoses:    Diagnosis Date Noted POA    PRINCIPAL PROBLEM:  Myxedema coma [E03.5] 03/08/2024 Unknown    Stupor [R40.1] 03/08/2024 Unknown    Hypokalemia [E87.6] 03/08/2024 Unknown    Confusion [R41.0] 03/08/2024 Unknown    Idiopathic hypotension [I95.0] 03/08/2024 Unknown     - Clinical picture is not entirely clear. TSH is markedly elevated. T4 is 1.45 which is a reasonable level. Pt is not necessarily comatose but I am not entirely certain the etiology otherwise. We will treat for severe hypothyroidism " and give stress dose steroids given hypotension.   - IV levothyroxine with IV hydrocortisone (Liothyronine is unavailable at this institution)  - Check daily TSH with T4/T3 levels  - Check cosyntropin stim before and after  - Check RUQ US due to elevated LFTs. TB is normal however.   - Check ceruloplasmin level  - Check CXR  - Check 2D echocardiogram  - Replace lytes and follow labs  - No evidence of infection at this juncture    Critical care time 45 mins    This encounter was completed via telemedicine (audio/video) w/ nursing at bedside ot assist w/ clinical exam.  SOC Audio/Visual Equipment is using HIPPA Compliant Web Platform. The patient is located in the hospital and the provider is located off site. This patient is placed in inpatient status under my care.       Participants on Call: Bedside RN, Patient, Physician on Call.   Problems Resolved During this Admission:     VTE Risk Mitigation (From admission, onward)           Ordered     IP VTE HIGH RISK PATIENT  Once         03/08/24 0214     Place sequential compression device  Until discontinued         03/08/24 0214                      Jaime Isaac MD  Department of Hospital Medicine   [facility]

## 2024-03-08 NOTE — NURSING
Iv fluids infusing , patient has spasticity and tremors, uncontrollable, paralyzed below the waist, able to move toes on left foot, has been having wound care at nursing home in [place], old blood in mouth , oral care given, also able to drink water without issue

## 2024-03-08 NOTE — PROGRESS NOTES
Hospital Medicine  Progress Note    Patient Name: Tiffany Webber  MRN: [id number]  Status: IP- Inpatient   Admission Date: 3/7/2024  Length of Stay: 0  Date of Service: 03/08/2024       CC: hospital follow-up for        SUBJECTIVE    63-year-old female sent from [facility] for altered mental status for the past two days. The patient is completely altered and jittery with involuntary shaking movements and is unable to give any history. She does not answer questions appropriately. The [facility] psychiatric center ordered a head CT on arrival which was negative. Her TSH is found to be 24,000 here. T4 is 1.45. T3 is unavailable because these are sent out. She was started on Zyprexa apparently due to hallucinations at the psychiatric facility. She does have significant edema in the bilateral lower extremities. 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 She was given IV levothyroxine in the emergency department.       Today: Patient seen and examined at bedside, and chart reviewed.       MEDICATIONS   Scheduled   acetaZOLAMIDE  250 mg Oral Daily    DULoxetine  60 mg Oral Daily    enoxparin  40 mg Subcutaneous Q24H (prophylaxis, 1700)    hydrocortisone sodium succinate  100 mg Intravenous Q8H    levothyroxine  100 mcg Intravenous Daily    midodrine  5 mg Oral TID    minocycline  100 mg Oral Q12H    mupirocin   Nasal BID    pantoprazole  40 mg Oral Daily     Continuous Infusions   sodium chloride 0.9% 50 mL/hr at 03/08/24 0940         PHYSICAL EXAM   VITALS: T 97.4 °F (36.3 °C)   BP (!) 149/76   P (!) 115   RR (!) 25   O2 (!) 94 %    GENERAL: anxious, confused, no distress   LUNGS: CTA B/L  CVS: tachycardic  GI/: Soft, NT, bowel sounds positive.  EXTREMITIES: No peripheral edema  NEURO:  AMS        LABS   CBC  Recent Labs     03/08/24  0029 03/08/24  0330   WBC 11.20 10.91   RBC 3.59* 3.69*   HGB 11.7* 11.6*   HCT 34.3* 35.9*   MCV 95.5 97.3   MCH 32.6 31.4   MCHC 34.1 32.3   RDW 15.3* 15.9*    290     CHEM  Recent Labs      03/08/24  0029 03/08/24  0330    142   K 3.2* 4.3   CHLORIDE 104 103   CO2 36* 35*   BUN 15.0 16.0   CREATININE 0.86 0.75   GLUCOSE 93 89   CALCIUM 8.7 8.7   MG 2.20  --    ALBUMIN 3.3* 3.5   GLOBULIN 4.1* 4.4*   ALKPHOS 142 124   ALT 66* 72*   * 169*   BILITOT 0.8 1.1*   TSH 23.800* 21.000*         MICROBIOLOGY     Microbiology Results (last 7 days)       Procedure Component Value Units Date/Time    Blood Culture x two cultures. Draw prior to antibiotics [[id number]] Collected: 03/08/24 0047    Order Status: Resulted Specimen: Blood Updated: 03/08/24 0113    Blood Culture x two cultures. Draw prior to antibiotics [[id number]] Collected: 03/08/24 0100    Order Status: Resulted Specimen: Blood Updated: 03/08/24 0113              DIAGNOSTICS   X-Ray Chest AP Portable  Narrative: EXAMINATION:  XR CHEST AP PORTABLE    CLINICAL HISTORY:  Dyspnea on exertion and hypoxia the right greater left lungs are hypoaerated with sepsis and altered mental status.    TECHNIQUE:  Single frontal view of the chest was performed.    COMPARISON:  Comparison study report but no images from outside x-ray performed on 06/06/2023.    FINDINGS:  The right greater left lungs are hypoaerated.  There are hazy bandlike opacities in the right lung base more prominent laterally.  No suspicious interstitial or alveolar opacities, lung nodule/mass, effusion, or pneumothorax is present.  The heart is partially obscured with suspected cardiomegaly.  The aortic arch appears prominent on the left.  There are postsurgical changes with spinal stabilization rods in the bilateral posterior thoracic spine extending caudally out of the field of view.  Impression: 1. Right greater left hypoaeration with bandlike opacities in the right lung base that most resembles subsegmental/discoid atelectasis versus pleuroparenchymal scarring.  2. Prominent of left-side of aortic arch may be due to ectatic aorta accentuated by rotation to the right, but the  thoracic aortic aneurysm cannot be excluded.  This can be further evaluated with a CT angiogram of the chest.    Electronically signed by: Christophe Barnett MD  Date:    03/08/2024  Time:    10:20  US Abdomen Limited_Liver  Narrative: RIGHT UPPER QUADRANT ULTRASOUND:    CPT: 50388    HISTORY: Elevated liver function tests.    Comparison: None.    FINDINGS:    Multiple transverse and sagittal grayscale sonographic images were acquired through the abdomen. Artifact from bowel gas and body habitus limits this exam with the pancreas mostly obscured and the liver partially obscured.  The visualized portion of the liver measures 15.1 cm in the craniocaudal midclavicular line.  The visualized liver is diffusely increased in echotexture. This increased echotexture decreases sensitivity to detect focal lesions due to decreased penetration. No focal lesions are identified in the partially visualized liver parenchyma.  The main portal vein is patent with hepatopetal flow.  There is sludge in the gallbladder, and there is gallbladder wall thickening to 0.35 cm.  There is no visible cholelithiasis, pericholecystic fluid, or sonographic 's sign. The common bile duct is unremarkable and measures 0.49 cm in its greatest AP diameter.  No images were obtained of the pancreas or right kidney.  Impression: 1. Sludge in gallbladder with gallbladder wall thickening.  Cholecystitis cannot be excluded.  2. Hepatic steatosis.    Electronically signed by: Christophe Barnett MD  Date:    03/08/2024  Time:    09:10        ASSESSMENT      PRINCIPAL PROBLEM:  Myxedema coma [E03.5] 03/08/2024 Unknown    Stupor [R40.1] 03/08/2024 Unknown    Hypokalemia [E87.6] 03/08/2024 Unknown    Confusion [R41.0] 03/08/2024 Unknown    Idiopathic hypotension [I95.0] 03/08/2024 Unknown     PLAN   Continue synthroid  Gentle ivfs  Echocardiogram pending  Iv abx   Iv steroids  Blood culture pending       Prophylaxis: lovecucox           Pradip Matthew MD  [facility]  Medicine

## 2024-03-08 NOTE — NURSING
Unable to get meds ordered for patient will have to wait until pharmacy arrives, message sent to dr. Isaac about this issue and about continuous iv fluids

## 2024-03-08 NOTE — ED PROVIDER NOTES
Encounter Date: 3/7/2024       History     Chief Complaint   Patient presents with    Fatigue     Wheeled in stretcher from [facility] with c/o lethargy and arouses to sternal rub per nurse    Low O2 sat     Nurse reported SPO2 sat at 83% on RA and 93% with 3L NC. Also reported moments of apnea     63-year-old female, rolled over from [facility] for altered mental status.  Apparently, symptoms started this afternoon.  They also report occasional episodes of apnea.  Arrival, she is arousable and responsive, but confused.  She had a CT scan of her head at 3:44 a.m. this afternoon, that showed no acute changes.  She is in [facility] for psychosis.  She is on long-acting morphine, oxycodone, Zanaflex and was recently started on Zyprexa.  He is on minocycline for osteomyelitis, and is on midodrine for hypotension    The history is provided by the patient and medical records ([facility] staff).     Review of patient's allergies indicates:   Allergen Reactions    Augmentin [amoxicillin-pot clavulanate]      Past Medical History:   Diagnosis Date    Depression     Diabetes mellitus     Hypertension     Hypotension     Hypothyroidism, unspecified     Unspecified viral hepatitis C without hepatic coma      Past Surgical History:   Procedure Laterality Date    THORACIC SPINE SURGERY      TOTAL KNEE ARTHROPLASTY Left      History reviewed. No pertinent family history.     Review of Systems   Constitutional:  Negative for fever.   HENT:  Negative for sore throat.    Respiratory:  Negative for shortness of breath.    Cardiovascular:  Negative for chest pain.   Gastrointestinal:  Negative for nausea.   Genitourinary:  Negative for dysuria.   Musculoskeletal:  Negative for back pain.   Skin:  Negative for rash.   Neurological:  Negative for weakness.   Hematological:  Does not bruise/bleed easily.   Psychiatric/Behavioral:  Positive for confusion and decreased concentration.         Lethargy, occasional episodes of  apnea   All other systems reviewed and are negative.      Physical Exam     Initial Vitals   BP Pulse Resp Temp SpO2   03/07/24 2339 03/07/24 2339 03/07/24 2339 03/07/24 2347 03/07/24 2339   (!) 154/98 106 14 98.4 °F (36.9 °C) 96 %      MAP       --                Physical Exam    Nursing note and vitals reviewed.  Constitutional: Vital signs are normal. She appears well-developed and well-nourished. She is cooperative.   Morbidly obese female in no apparent distress.  Sleepy but arousable.   HENT:   Head: Normocephalic and atraumatic.   Mouth/Throat: Oropharynx is clear and moist.   Eyes: Conjunctivae, EOM and lids are normal. Pupils are equal, round, and reactive to light.   Pupils are midposition and reactive   Neck: Trachea normal. Neck supple.   Normal range of motion.  Cardiovascular:  Normal rate, regular rhythm, normal heart sounds and intact distal pulses.           Pulmonary/Chest: Breath sounds normal.   Abdominal: Abdomen is soft. Bowel sounds are normal.   Musculoskeletal:         General: Normal range of motion.      Cervical back: Normal, normal range of motion and neck supple.      Lumbar back: Normal.     Neurological: She is alert. She has normal strength. Coordination normal.   She is disoriented to time and place   Skin: Skin is warm, dry and intact. Capillary refill takes less than 2 seconds.   Psychiatric: Her speech is normal. Cognition and memory are normal.         ED Course   Critical Care    Date/Time: 3/7/2024 11:32 PM    Performed by: Victoriano Dias MD  Authorized by: Pradip Matthew MD  Direct patient critical care time: 15 minutes  Additional history critical care time: 15 minutes  Ordering / reviewing critical care time: 15 minutes  Documentation critical care time: 15 minutes  Consulting other physicians critical care time: 10 minutes  Total critical care time (exclusive of procedural time) : 70 minutes  Critical care time was exclusive of separately billable procedures and  treating other patients and teaching time.  Critical care was necessary to treat or prevent imminent or life-threatening deterioration of the following conditions: endocrine crisis.  Critical care was time spent personally by me on the following activities: evaluation of patient's response to treatment, examination of patient, obtaining history from patient or surrogate, ordering and performing treatments and interventions, ordering and review of laboratory studies, ordering and review of radiographic studies, pulse oximetry, re-evaluation of patient's condition and review of old charts.  Subsequent provider of critical care: I assumed direction of critical care for this patient from another provider of my specialty.        Labs Reviewed   COMPREHENSIVE METABOLIC PANEL - Abnormal; Notable for the following components:       Result Value    Potassium Level 3.2 (*)     Carbon Dioxide 36 (*)     Albumin Level 3.3 (*)     Globulin 4.1 (*)     Alanine Aminotransferase 66 (*)     Aspartate Aminotransferase 149 (*)     All other components within normal limits   URINALYSIS, REFLEX TO URINE CULTURE - Abnormal; Notable for the following components:    Ketones, UA 15 (*)     Bilirubin, UA Moderate (*)     Urobilinogen, UA 2.0 (*)     All other components within normal limits    Narrative:      URINE STABILITY IS 2 HOURS AT ROOM TEMP OR    SIX HOURS REFRIGERATED. PERFORMING TESTING ON    SPECIMENS GREATER THAN THIS AGE MAY AFFECT THE    FOLLOWING TESTS:    PH          SPECIFIC GRAVITY           BLOOD    CLARITY     BILIRUBIN               UROBILINOGEN   NT-PRO NATRIURETIC PEPTIDE - Abnormal; Notable for the following components:    ProBNP 584.0 (*)     All other components within normal limits   TSH - Abnormal; Notable for the following components:    TSH 23.800 (*)     All other components within normal limits   BLOOD GAS - Abnormal; Notable for the following components:    pCO2, Blood gas 51.2 (*)     pO2, Blood gas 71.5 (*)      sO2, Blood gas 94.7 (*)     HCO3, Blood gas 30.6 (*)     Base Excess, Blood gas 6.80 (*)     All other components within normal limits   ACETAMINOPHEN LEVEL - Abnormal; Notable for the following components:    Acetaminophen Level <10.0 (*)     All other components within normal limits   DRUG SCREEN, URINE (BEAKER) - Abnormal; Notable for the following components:    Opiates, Urine Positive (*)     All other components within normal limits    Narrative:     Cut off concentrations:    Amphetamines - 1000 ng/ml  Barbiturates - 200 ng/ml  Benzodiazepine - 200 ng/ml  Cannabinoids (THC) - 50 ng/ml  Cocaine - 300 ng/ml  Fentanyl - 1.0 ng/ml  MDMA - 500 ng/ml  Opiates - 300 ng/ml   Phencyclidine (PCP) - 25 ng/ml  Methadone - 300 ng/ml      False negatives may result form substances such as bleach added to urine.  False positives may result for the presence of a substance with similar chemical structure to the drug or its metabolite.    This test provides only a PRELIMINARY analytical test result. A more specific alternate chemical method must be used in order to obtain a confirmed analytical result. Gas chromatography/mass spectrometry (GC/MS) is the preferred confirmatory method. Other chemical confirmation methods are available. Clinical consideration and professional judgement should be applied to any drug of abuse test result, particularly when preliminary positive results are used.    Positive results will be confirmed only at the physicians request. Unconfirmed screening results are to be used only for medical purposes (treatment).          CBC WITH DIFFERENTIAL - Abnormal; Notable for the following components:    RBC 3.59 (*)     Hgb 11.7 (*)     Hct 34.3 (*)     RDW 15.3 (*)     Neut # 7.00 (*)     Mono # 1.09 (*)     IG# 0.06 (*)     All other components within normal limits   LACTIC ACID, PLASMA - Normal   MAGNESIUM - Normal   TROPONIN I - Normal   SALICYLATE LEVEL - Normal   ALCOHOL,MEDICAL (ETHANOL) - Normal    T4, FREE - Normal   BLOOD CULTURE [facility]   BLOOD CULTURE [facility]   CBC W/ AUTO DIFFERENTIAL    Narrative:     The following orders were created for panel order CBC auto differential.  Procedure                               Abnormality         Status                     ---------                               -----------         ------                     CBC with Differential[[id number]]       Abnormal            Final result                 Please view results for these tests on the individual orders.   T3,FREE ([facility] ONLY)        ECG Results              EKG 12-lead (Preliminary result)  Result time 03/07/24 23:59:50      Wet Read by Victoriano Dias MD (03/07/24 23:59:50, [facility])    Sinus tachycardia with occasional PVCs, normal axis, heart rate is 113, normal P waves, widened QRS consistent with right bundle-branch block, nonspecific ST and T-wave changes, normal QT.                                  Imaging Results              X-Ray Chest AP Portable (In process)                      Medications   potassium chloride 10 mEq in 100 mL IVPB (10 mEq Intravenous New Bag 3/8/24 0157)   levothyroxine injection 200 mcg (200 mcg Intravenous Given 3/8/24 0210)     Medical Decision Making  Lethargy, somnolence, multiple sedating meds, multiple medical problems, unremarkable head CT this afternoon  Differential diagnosis:  Medication reaction, sepsis, occult infection, electrolyte imbalance, dehydration  Septic workup    Amount and/or Complexity of Data Reviewed  Labs: ordered. Decision-making details documented in ED Course.  Radiology: ordered.  Discussion of management or test interpretation with external provider(s): Through the workup, the only abnormality is a TSH of almost 24,000. Given her clinical symptoms, this could definitely represent myxedema coma.  A free T3 and free T4 can be done, but only as a send out, and will not be back for at least a couple of hours.   Considering myxedema coma is a medical emergency, I will go ahead and order the levothyroxine, and talked to the hospitalist.  The patient will need to stay in the ICU.    Risk  Prescription drug management.  Decision regarding hospitalization.               ED Course as of 03/08/24 0212   Fri Mar 08, 2024   0015 Urinalysis, Reflex to Urine Culture(!)  Dipstick does not indicate a UTI [TM]   0018 RT Blood Gas(!)  Normal pH, mild hypercapnia, mild hypoxia [TM]   0128 Comprehensive metabolic panel(!)  Mild hypokalemia [TM]      ED Course User Index  [TM] Victoriano Dias MD                           Clinical Impression:  Final diagnoses:  [R41.0] Confusion  [E03.5] Myxedema coma (Primary)  [E87.6] Hypokalemia  [R40.1] Stupor  [I95.0] Idiopathic hypotension          ED Disposition Condition    Admit Fair                Victoriano Dias MD  03/08/24 0212

## 2024-03-08 NOTE — PROGRESS NOTES
[facility]-ICU  Wound Care    Patient Name:  Tiffany Webber   MRN:  [id number]  Date: 3/8/2024  Diagnosis: Myxedema coma    History:     Past Medical History:   Diagnosis Date    Depression     Diabetes mellitus     Hypertension     Hypotension     Hypothyroidism, unspecified     Unspecified viral hepatitis C without hepatic coma        Social History     Socioeconomic History    Marital status: Unknown     Social Determinants of Health     Financial Resource Strain: Low Risk  (3/8/2024)    Overall Financial Resource Strain (CARDIA)     Difficulty of Paying Living Expenses: Not hard at all   Food Insecurity: No Food Insecurity (3/8/2024)    Hunger Vital Sign     Worried About Running Out of Food in the Last Year: Never true     Ran Out of Food in the Last Year: Never true   Transportation Needs: No Transportation Needs (3/8/2024)    PRAPARE - Transportation     Lack of Transportation (Medical): No     Lack of Transportation (Non-Medical): No   Physical Activity: Inactive (3/8/2024)    Exercise Vital Sign     Days of Exercise per Week: 0 days     Minutes of Exercise per Session: 0 min   Stress: Stress Concern Present (3/8/2024)    [place] [place] [employer] - Occupational Stress Questionnaire     Feeling of Stress : To some extent   Social Connections: Unknown (3/8/2024)    Social Connection and Isolation Panel [NHANES]     Frequency of Communication with Friends and Family: More than three times a week     Frequency of Social Gatherings with Friends and Family: More than three times a week     Attends [employer] Services: Patient unable to answer     Active Member of Clubs or Organizations: No     Attends Club or Organization Meetings: Never     Marital Status: Patient unable to answer   Housing Stability: Low Risk  (3/8/2024)    Housing Stability Vital Sign     Unable to Pay for Housing in the Last Year: No     Number of Places Lived in the Last Year: 1     Unstable Housing in the Last Year:  No       Precautions:     Allergies as of 03/07/2024 - Reviewed 03/07/2024   Allergen Reaction Noted    Augmentin [amoxicillin-pot clavulanate]  03/07/2024       WOC Assessment Details/Treatment        03/08/24 1359   WOCN Assessment   WOCN Total Time (mins) 90   Visit Date 03/08/24   Visit Time 1300   Consult Type New   WOCN Speciality Wound   Intervention assessed;applied   Skin Interventions   Pressure Reduction Devices pressure-redistributing mattress utilized;heel offloading device utilized   Pressure Reduction Techniques heels elevated off bed;weight shift assistance provided   Positioning   Positioning/Transfer Devices in use;pillows   Pressure Injury Prevention    Check Moisture Management Pad Done   Heel protection technique Heel boot   Heel preventative measures Peel back dressing/boot, assess skin and reapply        Altered Skin Integrity 03/08/24 1357 Left lateral Heel Purple or maroon localized area of discolored intact skin or non-intact skin or a blood-filled blister.   Date First Assessed/Time First Assessed: 03/08/24 1357   Altered Skin Integrity Present on Admission - Did Patient arrive to the hospital with altered skin?: yes  Side: Left  Orientation: lateral  Location: Heel  Description of Altered Skin Integrity:...   Wound Image    Description of Altered Skin Integrity Purple or maroon localized area of discolored intact skin or non-intact skin or a blood-filled blister.   Dressing Appearance Open to air   Drainage Amount None   Appearance Intact;Maroon   Periwound Area Redness  (blanchable)   Wound Length (cm) 1.2 cm   Wound Width (cm) 1 cm   Wound Depth (cm) 0 cm   Wound Volume (cm^3) 0 cm^3   Wound Surface Area (cm^2) 1.2 cm^2   Care Applied:;Skin Barrier   Dressing   (left open to air.  In heel elevation boot.)   Dressing Change Due 03/11/24        Altered Skin Integrity 03/08/24 0315 Right posterior Thigh Other (comment)   Date First Assessed/Time First Assessed: 03/08/24 0315   Altered Skin  Integrity Present on Admission - Did Patient arrive to the hospital with altered skin?: yes  Side: (c) Right  Orientation: posterior  Location: Thigh  Is this injury device related?...   Wound Image    Dressing Appearance Open to air   Drainage Amount None   Appearance Intact  (red linear discoloration; blanchable.  widespread Light purple discoloration also noted; blanchable as well.)   Periwound Area Redness   Care   (barrier cream to purple discoloration to posterior thigh and skin prep to red area)   Dressing Change Due 03/11/24        Altered Skin Integrity 03/08/24 0315 Right lateral Calf Other (comment)   Date First Assessed/Time First Assessed: 03/08/24 0315   Altered Skin Integrity Present on Admission - Did Patient arrive to the hospital with altered skin?: yes  Side: Right  Orientation: lateral  Location: Calf  Primary Wound Type: (c) Other (comment)   Wound Image    Dressing Appearance Open to air   Drainage Amount None   Appearance Purple  (dry and flaky)   Care   (new order noted for aquaphor Q12 hours)        Altered Skin Integrity 03/08/24 1359 midline Thoracic spine Moisture associated dermatitis   Date First Assessed/Time First Assessed: 03/08/24 1359   Altered Skin Integrity Present on Admission - Did Patient arrive to the hospital with altered skin?: yes  Orientation: midline  Location: Thoracic spine  Primary Wound Type: Moisture associated ...   Wound Image    Dressing Appearance Open to air   Drainage Amount None   Appearance Pink;Smooth;Moist   Tissue loss description Partial thickness   Wound Length (cm) 1.5 cm   Wound Width (cm) 0.2 cm   Wound Depth (cm) 0.1 cm   Wound Volume (cm^3) 0.03 cm^3   Wound Surface Area (cm^2) 0.3 cm^2   Care Cleansed with:;Sterile normal saline;Applied:  (barrier cream)     Orders placed for aquaphor Q12 hours to dry patchy area on right lateral lower leg.  Barrier cream to back MASD and meaghan/posterior thigh discoloration.  Skin prep to left heel and inner right  upper thigh redness.  Patient speaking out of her head during visit.  Swinging at nurses and rambling.    03/08/2024

## 2024-03-09 LAB
ALBUMIN SERPL-MCNC: 2.5 G/DL (ref 3.4–5)
ALBUMIN SERPL-MCNC: 2.9 G/DL (ref 3.4–5)
ALBUMIN/GLOB SERPL: 0.7 RATIO
ALBUMIN/GLOB SERPL: 0.8 RATIO
ALP SERPL-CCNC: 115 UNIT/L (ref 50–144)
ALP SERPL-CCNC: 97 UNIT/L (ref 50–144)
ALT SERPL-CCNC: 50 UNIT/L (ref 1–45)
ALT SERPL-CCNC: 55 UNIT/L (ref 1–45)
AMMONIA PLAS-MSCNC: <9 UMOL/L (ref 11–32)
ANION GAP SERPL CALC-SCNC: 3 MEQ/L (ref 2–13)
ANION GAP SERPL CALC-SCNC: 5 MEQ/L (ref 2–13)
AST SERPL-CCNC: 108 UNIT/L (ref 14–36)
AST SERPL-CCNC: 117 UNIT/L (ref 14–36)
BASE EXCESS BLD CALC-SCNC: 4 MMOL/L (ref -2–2)
BASOPHILS # BLD AUTO: 0.01 X10(3)/MCL (ref 0.01–0.08)
BASOPHILS # BLD AUTO: 0.03 X10(3)/MCL (ref 0.01–0.08)
BASOPHILS NFR BLD AUTO: 0.1 % (ref 0.1–1.2)
BASOPHILS NFR BLD AUTO: 0.3 % (ref 0.1–1.2)
BILIRUB SERPL-MCNC: 0.5 MG/DL (ref 0–1)
BILIRUB SERPL-MCNC: 0.6 MG/DL (ref 0–1)
BLOOD GAS SAMPLE TYPE (OHS): ABNORMAL
BUN SERPL-MCNC: 14 MG/DL (ref 7–20)
BUN SERPL-MCNC: 15 MG/DL (ref 7–20)
CALCIUM SERPL-MCNC: 8.1 MG/DL (ref 8.4–10.2)
CALCIUM SERPL-MCNC: 8.2 MG/DL (ref 8.4–10.2)
CALCIUM SERPL-MCNC: 8.3 MG/DL (ref 8.4–10.2)
CHLORIDE SERPL-SCNC: 108 MMOL/L (ref 98–110)
CHLORIDE SERPL-SCNC: 108 MMOL/L (ref 98–110)
CK SERPL-CCNC: 511 U/L (ref 30–135)
CO2 SERPL-SCNC: 28 MMOL/L (ref 21–32)
CO2 SERPL-SCNC: 30 MMOL/L (ref 21–32)
COHGB MFR BLDA: 1 % (ref 0–1.5)
CREAT SERPL-MCNC: 0.65 MG/DL (ref 0.66–1.25)
CREAT SERPL-MCNC: 0.65 MG/DL (ref 0.66–1.25)
CREAT/UREA NIT SERPL: 22 (ref 12–20)
CREAT/UREA NIT SERPL: 23 (ref 12–20)
EOSINOPHIL # BLD AUTO: 0 X10(3)/MCL (ref 0.04–0.36)
EOSINOPHIL # BLD AUTO: 0.01 X10(3)/MCL (ref 0.04–0.36)
EOSINOPHIL NFR BLD AUTO: 0 % (ref 0.7–7)
EOSINOPHIL NFR BLD AUTO: 0.1 % (ref 0.7–7)
ERYTHROCYTE [DISTWIDTH] IN BLOOD BY AUTOMATED COUNT: 15.3 % (ref 11–14.5)
ERYTHROCYTE [DISTWIDTH] IN BLOOD BY AUTOMATED COUNT: 15.5 % (ref 11–14.5)
GFR SERPLBLD CREATININE-BSD FMLA CKD-EPI: >90 MLS/MIN/1.73/M2
GFR SERPLBLD CREATININE-BSD FMLA CKD-EPI: >90 MLS/MIN/1.73/M2
GLOBULIN SER-MCNC: 3.4 GM/DL (ref 2–3.9)
GLOBULIN SER-MCNC: 3.8 GM/DL (ref 2–3.9)
GLUCOSE SERPL-MCNC: 94 MG/DL (ref 70–115)
GLUCOSE SERPL-MCNC: 97 MG/DL (ref 70–115)
HCO3 BLDA-SCNC: 27.9 MMOL/L (ref 22–26)
HCT VFR BLD AUTO: 26.7 % (ref 36–48)
HCT VFR BLD AUTO: 28.4 % (ref 36–48)
HGB BLD-MCNC: 8.8 G/DL (ref 11.8–16)
HGB BLD-MCNC: 9.5 G/DL (ref 11.8–16)
IMM GRANULOCYTES # BLD AUTO: 0.06 X10(3)/MCL (ref 0–0.03)
IMM GRANULOCYTES # BLD AUTO: 0.06 X10(3)/MCL (ref 0–0.03)
IMM GRANULOCYTES NFR BLD AUTO: 0.6 % (ref 0–0.5)
IMM GRANULOCYTES NFR BLD AUTO: 0.6 % (ref 0–0.5)
INHALED O2 CONCENTRATION: 21 %
IP (OHS): 0 CMH2O
LPM (OHS): 2
LYMPHOCYTES # BLD AUTO: 2.58 X10(3)/MCL (ref 1.16–3.74)
LYMPHOCYTES # BLD AUTO: 2.85 X10(3)/MCL (ref 1.16–3.74)
LYMPHOCYTES NFR BLD AUTO: 26.1 % (ref 20–55)
LYMPHOCYTES NFR BLD AUTO: 26.8 % (ref 20–55)
MCH RBC QN AUTO: 31.4 PG (ref 27–34)
MCH RBC QN AUTO: 31.8 PG (ref 27–34)
MCHC RBC AUTO-ENTMCNC: 33 G/DL (ref 31–37)
MCHC RBC AUTO-ENTMCNC: 33.5 G/DL (ref 31–37)
MCV RBC AUTO: 95 FL (ref 79–99)
MCV RBC AUTO: 95.4 FL (ref 79–99)
METHGB MFR BLDA: 0.8 % (ref 0–1.5)
MONOCYTES # BLD AUTO: 0.92 X10(3)/MCL (ref 0.24–0.36)
MONOCYTES # BLD AUTO: 1.04 X10(3)/MCL (ref 0.24–0.36)
MONOCYTES NFR BLD AUTO: 9.3 % (ref 4.7–12.5)
MONOCYTES NFR BLD AUTO: 9.8 % (ref 4.7–12.5)
NEUTROPHILS # BLD AUTO: 6.31 X10(3)/MCL (ref 1.56–6.13)
NEUTROPHILS # BLD AUTO: 6.64 X10(3)/MCL (ref 1.56–6.13)
NEUTROPHILS NFR BLD AUTO: 62.4 % (ref 37–73)
NEUTROPHILS NFR BLD AUTO: 63.9 % (ref 37–73)
NOTIFIED (OHS): ABNORMAL
NOTIFIED BY (OHS): ABNORMAL
NRBC BLD AUTO-RTO: 0.2 %
NRBC BLD AUTO-RTO: 0.3 %
OXYGEN DEVICE BLOOD GAS (OHS): ABNORMAL
PAW @ PEAK INSP FLOW SETTING VENT: 0 CMH20
PCO2 BLDA: 42.9 MMHG (ref 35–45)
PH BLDA: 7.43 [PH] (ref 7.35–7.45)
PLATELET # BLD AUTO: 255 X10(3)/MCL (ref 140–371)
PLATELET # BLD AUTO: 280 X10(3)/MCL (ref 140–371)
PMV BLD AUTO: 10.6 FL (ref 9.4–12.4)
PMV BLD AUTO: 10.7 FL (ref 9.4–12.4)
PO2 BLDA: 72 MMHG (ref 80–105)
POCT GLUCOSE: 119 MG/DL (ref 70–110)
POCT GLUCOSE: 140 MG/DL (ref 70–110)
POCT GLUCOSE: 59 MG/DL (ref 70–110)
POCT GLUCOSE: 89 MG/DL (ref 70–110)
POCT GLUCOSE: 98 MG/DL (ref 70–110)
POTASSIUM SERPL-SCNC: 2.4 MMOL/L (ref 3.5–5.1)
POTASSIUM SERPL-SCNC: 2.9 MMOL/L (ref 3.5–5.1)
POTASSIUM SERPL-SCNC: 3 MMOL/L (ref 3.5–5.1)
PROT SERPL-MCNC: 5.9 GM/DL (ref 6.3–8.2)
PROT SERPL-MCNC: 6.7 GM/DL (ref 6.3–8.2)
PTH-INTACT SERPL-MCNC: <22.84 PG/ML (ref 14–73)
RBC # BLD AUTO: 2.8 X10(6)/MCL (ref 4–5.1)
RBC # BLD AUTO: 2.99 X10(6)/MCL (ref 4–5.1)
SAO2 % BLDA: 95.2 % (ref 95–100)
SODIUM SERPL-SCNC: 141 MMOL/L (ref 135–145)
SODIUM SERPL-SCNC: 141 MMOL/L (ref 135–145)
T3FREE SERPL-MCNC: <1.5 PG/ML (ref 1.58–3.91)
TSH SERPL-ACNC: 4.25 UIU/ML (ref 0.36–3.74)
WBC # SPEC AUTO: 10.63 X10(3)/MCL (ref 4–11.5)
WBC # SPEC AUTO: 9.88 X10(3)/MCL (ref 4–11.5)

## 2024-03-09 PROCEDURE — 27000221 HC OXYGEN, UP TO 24 HOURS

## 2024-03-09 PROCEDURE — 20000000 HC ICU ROOM

## 2024-03-09 PROCEDURE — 84132 ASSAY OF SERUM POTASSIUM: CPT | Performed by: FAMILY MEDICINE

## 2024-03-09 PROCEDURE — 25000003 PHARM REV CODE 250: Performed by: INTERNAL MEDICINE

## 2024-03-09 PROCEDURE — 85025 COMPLETE CBC W/AUTO DIFF WBC: CPT | Performed by: FAMILY MEDICINE

## 2024-03-09 PROCEDURE — 99900035 HC TECH TIME PER 15 MIN (STAT)

## 2024-03-09 PROCEDURE — 80053 COMPREHEN METABOLIC PANEL: CPT | Performed by: FAMILY MEDICINE

## 2024-03-09 PROCEDURE — 63600175 PHARM REV CODE 636 W HCPCS: Performed by: FAMILY MEDICINE

## 2024-03-09 PROCEDURE — 83970 ASSAY OF PARATHORMONE: CPT | Performed by: FAMILY MEDICINE

## 2024-03-09 PROCEDURE — 63600175 PHARM REV CODE 636 W HCPCS: Performed by: INTERNAL MEDICINE

## 2024-03-09 PROCEDURE — 25000003 PHARM REV CODE 250: Performed by: FAMILY MEDICINE

## 2024-03-09 PROCEDURE — 84481 FREE ASSAY (FT-3): CPT | Performed by: INTERNAL MEDICINE

## 2024-03-09 PROCEDURE — 84443 ASSAY THYROID STIM HORMONE: CPT | Performed by: INTERNAL MEDICINE

## 2024-03-09 PROCEDURE — 82140 ASSAY OF AMMONIA: CPT | Performed by: FAMILY MEDICINE

## 2024-03-09 PROCEDURE — 94761 N-INVAS EAR/PLS OXIMETRY MLT: CPT

## 2024-03-09 PROCEDURE — 82550 ASSAY OF CK (CPK): CPT | Performed by: FAMILY MEDICINE

## 2024-03-09 RX ORDER — DEXMEDETOMIDINE HYDROCHLORIDE 4 UG/ML
0-1.4 INJECTION, SOLUTION INTRAVENOUS CONTINUOUS
Status: DISCONTINUED | OUTPATIENT
Start: 2024-03-09 | End: 2024-03-11 | Stop reason: HOSPADM

## 2024-03-09 RX ORDER — DEXMEDETOMIDINE HYDROCHLORIDE 4 UG/ML
INJECTION, SOLUTION INTRAVENOUS
Status: DISPENSED
Start: 2024-03-09 | End: 2024-03-09

## 2024-03-09 RX ORDER — DEXTROSE MONOHYDRATE AND SODIUM CHLORIDE 5; .9 G/100ML; G/100ML
INJECTION, SOLUTION INTRAVENOUS CONTINUOUS
Status: DISCONTINUED | OUTPATIENT
Start: 2024-03-09 | End: 2024-03-09

## 2024-03-09 RX ORDER — DEXTROSE MONOHYDRATE, SODIUM CHLORIDE, AND POTASSIUM CHLORIDE 50; 2.98; 9 G/1000ML; G/1000ML; G/1000ML
INJECTION, SOLUTION INTRAVENOUS CONTINUOUS
Status: DISCONTINUED | OUTPATIENT
Start: 2024-03-09 | End: 2024-03-10

## 2024-03-09 RX ORDER — POTASSIUM CHLORIDE 7.45 MG/ML
10 INJECTION INTRAVENOUS
Status: COMPLETED | OUTPATIENT
Start: 2024-03-09 | End: 2024-03-09

## 2024-03-09 RX ORDER — HYDRALAZINE HYDROCHLORIDE 20 MG/ML
10 INJECTION INTRAMUSCULAR; INTRAVENOUS EVERY 4 HOURS PRN
Status: DISCONTINUED | OUTPATIENT
Start: 2024-03-09 | End: 2024-03-11 | Stop reason: HOSPADM

## 2024-03-09 RX ADMIN — DEXTROSE AND SODIUM CHLORIDE: 5; 900 INJECTION, SOLUTION INTRAVENOUS at 01:03

## 2024-03-09 RX ADMIN — WHITE PETROLATUM: 1.75 OINTMENT TOPICAL at 10:03

## 2024-03-09 RX ADMIN — LEVOTHYROXINE SODIUM ANHYDROUS 100 MCG: 100 INJECTION, POWDER, LYOPHILIZED, FOR SOLUTION INTRAVENOUS at 10:03

## 2024-03-09 RX ADMIN — DEXTROSE MONOHYDRATE, SODIUM CHLORIDE, AND POTASSIUM CHLORIDE: 50; 9; 2.98 INJECTION, SOLUTION INTRAVENOUS at 06:03

## 2024-03-09 RX ADMIN — POTASSIUM CHLORIDE 10 MEQ: 7.46 INJECTION, SOLUTION INTRAVENOUS at 12:03

## 2024-03-09 RX ADMIN — MUPIROCIN: 20 OINTMENT TOPICAL at 10:03

## 2024-03-09 RX ADMIN — POTASSIUM CHLORIDE 10 MEQ: 7.46 INJECTION, SOLUTION INTRAVENOUS at 10:03

## 2024-03-09 RX ADMIN — DEXMEDETOMIDINE HYDROCHLORIDE 0.9 MCG/KG/HR: 400 INJECTION INTRAVENOUS at 10:03

## 2024-03-09 RX ADMIN — METRONIDAZOLE 500 MG: 5 INJECTION, SOLUTION INTRAVENOUS at 04:03

## 2024-03-09 RX ADMIN — ENOXAPARIN SODIUM 40 MG: 40 INJECTION SUBCUTANEOUS at 04:03

## 2024-03-09 RX ADMIN — DEXMEDETOMIDINE HYDROCHLORIDE 0.2 MCG/KG/HR: 400 INJECTION INTRAVENOUS at 06:03

## 2024-03-09 RX ADMIN — DIPHENHYDRAMINE HYDROCHLORIDE 25 MG: 50 INJECTION INTRAMUSCULAR; INTRAVENOUS at 04:03

## 2024-03-09 RX ADMIN — HYDRALAZINE HYDROCHLORIDE 10 MG: 20 INJECTION INTRAMUSCULAR; INTRAVENOUS at 09:03

## 2024-03-09 RX ADMIN — MORPHINE SULFATE 2 MG: 2 INJECTION, SOLUTION INTRAMUSCULAR; INTRAVENOUS at 05:03

## 2024-03-09 RX ADMIN — DEXMEDETOMIDINE HYDROCHLORIDE 0.9 MCG/KG/HR: 400 INJECTION INTRAVENOUS at 07:03

## 2024-03-09 RX ADMIN — METRONIDAZOLE 500 MG: 5 INJECTION, SOLUTION INTRAVENOUS at 06:03

## 2024-03-09 RX ADMIN — DEXTROSE MONOHYDRATE 500 ML: 50 INJECTION, SOLUTION INTRAVENOUS at 12:03

## 2024-03-09 RX ADMIN — POTASSIUM CHLORIDE 10 MEQ: 7.46 INJECTION, SOLUTION INTRAVENOUS at 05:03

## 2024-03-09 RX ADMIN — POTASSIUM CHLORIDE 10 MEQ: 7.46 INJECTION, SOLUTION INTRAVENOUS at 06:03

## 2024-03-09 RX ADMIN — POTASSIUM CHLORIDE 10 MEQ: 7.46 INJECTION, SOLUTION INTRAVENOUS at 11:03

## 2024-03-09 RX ADMIN — DEXMEDETOMIDINE HYDROCHLORIDE 0.6 MCG/KG/HR: 400 INJECTION INTRAVENOUS at 11:03

## 2024-03-09 RX ADMIN — DEXMEDETOMIDINE HYDROCHLORIDE 0.8 MCG/KG/HR: 400 INJECTION INTRAVENOUS at 04:03

## 2024-03-09 RX ADMIN — POTASSIUM CHLORIDE 10 MEQ: 7.46 INJECTION, SOLUTION INTRAVENOUS at 08:03

## 2024-03-09 RX ADMIN — MORPHINE SULFATE 2 MG: 2 INJECTION, SOLUTION INTRAMUSCULAR; INTRAVENOUS at 07:03

## 2024-03-09 RX ADMIN — METRONIDAZOLE 500 MG: 5 INJECTION, SOLUTION INTRAVENOUS at 10:03

## 2024-03-09 RX ADMIN — DEXTROSE MONOHYDRATE 2 G: 5 INJECTION INTRAVENOUS at 04:03

## 2024-03-09 RX ADMIN — DIPHENHYDRAMINE HYDROCHLORIDE 25 MG: 50 INJECTION INTRAMUSCULAR; INTRAVENOUS at 07:03

## 2024-03-09 NOTE — NURSING
RT at bedside for abg, patient now awake, eyes open, arms moving all around, grabbing at objects, restless, agitated, unable to console or converse with patient

## 2024-03-09 NOTE — NURSING
Contacted tele medicine for order for precedex, received order for haldol instead, aristeo, see mar, will continue to monitor  patient

## 2024-03-09 NOTE — NURSING
Contacted tele med, no improvement with haldol, received order for benadryl, torb, will continue to monitor

## 2024-03-09 NOTE — PLAN OF CARE
Problem: Adult Inpatient Plan of Care  Goal: Plan of Care Review  3/9/2024 1759 by Ramila Ceja, RN  Flowsheets (Taken 3/9/2024 1759)  Plan of Care Reviewed With:   patient   daughter  3/9/2024 1758 by Ramila Ceja RN  Outcome: Ongoing, Progressing  Goal: Patient-Specific Goal (Individualized)  Outcome: Ongoing, Progressing  Goal: Absence of Hospital-Acquired Illness or Injury  Outcome: Ongoing, Progressing  Goal: Optimal Comfort and Wellbeing  Outcome: Ongoing, Progressing  Goal: Readiness for Transition of Care  Outcome: Ongoing, Progressing     Problem: Bariatric Environmental Safety  Goal: Safety Maintained with Care  Outcome: Ongoing, Progressing     Problem: Infection  Goal: Absence of Infection Signs and Symptoms  Outcome: Ongoing, Progressing     Problem: Impaired Wound Healing  Goal: Optimal Wound Healing  Outcome: Ongoing, Progressing     Problem: Skin Injury Risk Increased  Goal: Skin Health and Integrity  Outcome: Ongoing, Progressing

## 2024-03-09 NOTE — NURSING
REPORTED TOTAL SHIFT OUTPUT OF 200CC TO DR. MORAES. ORDER RECEIVED TO INCREASE D5NS RATE TO 75ML/HR.

## 2024-03-09 NOTE — NURSING
Faxed face sheet, H&P, progress note, imaging reports and last 24 hour lab results to [facility] at [phone], as requested.  Will wait for call back.

## 2024-03-09 NOTE — NURSING
Checked patients blood sugar, resulted 59, tele med contacted for maintenance iv fluids containing dextrose

## 2024-03-09 NOTE — NURSING
Received critical potassium result from jerome in lab of 2.4, tele medicine contacted and informed of result, waiting for response

## 2024-03-09 NOTE — PROGRESS NOTES
Hospital Medicine  Progress Note    Patient Name: Tiffany Webber  MRN: [id number]  Status: IP- Inpatient   Admission Date: 3/7/2024  Length of Stay: 1  Date of Service: 03/09/2024       CC: hospital follow-up for        SUBJECTIVE   63-year-old female sent from [facility] for altered mental status for the past two days. The patient is completely altered and jittery with involuntary shaking movements and is unable to give any history. She does not answer questions appropriately. The geriatric psychiatric center ordered a head CT on arrival which was negative. Her TSH is found to be 24,000 here. T4 is 1.45. T3 is unavailable because these are sent out. She was started on Zyprexa apparently due to hallucinations at the psychiatric facility. She does have significant edema in the bilateral lower extremities. 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 She was given IV levothyroxine in the emergency department.      03/09/2024  Patient very agitated with several episodes diaphoresis overnight chemical sedation with several agents unsucessful  Spoke with family no hx psych, complete coherant last Friday Saturday behavioral issues was sent to [facility] from [facility]  Tried several sedative meds without any improvement  Suspect not myedema coma apparent working dx coming into buliding  But does appear to be in some type of thyroid storm/derangement  Seem to be resting comfortably this morning on precedex drip  Very hypertensive and tachycardiac overngiht  TSH back down to 4 from 24 overnight, T4 normal range T3 low  Spoke with daughter agree with donna for [facility]- endocrinolgy, neurology    Today: Patient seen and examined at bedside, and chart reviewed.       MEDICATIONS   Scheduled   acetaZOLAMIDE  250 mg Oral Daily    cefTRIAXone (Rocephin) IV (PEDS and ADULTS)  2 g Intravenous Q24H    dexmedeTOMIDine in 0.9 % NaCL        DULoxetine  60 mg Oral Daily    enoxparin  40 mg Subcutaneous Q24H (prophylaxis, 1700)     levothyroxine  100 mcg Intravenous Daily    metronidazole  500 mg Intravenous Q8H    minocycline  100 mg Oral Q12H    morphine  60 mg Oral Q12H    mupirocin   Nasal BID    pantoprazole  40 mg Oral Daily    potassium chloride  10 mEq Intravenous Q1H    white petrolatum   Topical (Top) Q12H     Continuous Infusions   sodium chloride 0.9% 50 mL/hr at 03/08/24 0940    dexmedeTOMIDine (Precedex) infusion (titrating) 0.4 mcg/kg/hr (03/09/24 0700)    dextrose 5 % and 0.9 % NaCl 50 mL/hr at 03/09/24 0139         PHYSICAL EXAM   VITALS: T 97.8 °F (36.6 °C)   BP (!) 169/81   P 107   RR (!) 25   O2 97 %    GENERAL: obese, resting in bed, sedated on precedex drip  LUNGS: CTA B/L  CVS: Normal rate  GI/: Soft, NT, bowel sounds positive.  EXTREMITIES: No peripheral edema  NEURO: sedated        LABS   CBC  Recent Labs     03/08/24  0330 03/09/24  0402   WBC 10.91 9.88   RBC 3.69* 2.80*   HGB 11.6* 8.8*   HCT 35.9* 26.7*   MCV 97.3 95.4   MCH 31.4 31.4   MCHC 32.3 33.0   RDW 15.9* 15.3*    255     CHEM  Recent Labs     03/08/24  0029 03/08/24  0330 03/09/24  0402    142 141   K 3.2* 4.3 2.4*   CHLORIDE 104 103 108   CO2 36* 35* 30   BUN 15.0 16.0 15.0   CREATININE 0.86 0.75 0.65*   GLUCOSE 93 89 97   CALCIUM 8.7 8.7 8.2*   MG 2.20  --   --    ALBUMIN 3.3* 3.5 2.5*   GLOBULIN 4.1* 4.4* 3.4   ALKPHOS 142 124 97   ALT 66* 72* 50*   * 169* 108*   BILITOT 0.8 1.1* 0.5   TSH 23.800* 21.000* 4.250*         MICROBIOLOGY     Microbiology Results (last 7 days)       Procedure Component Value Units Date/Time    Blood Culture x two cultures. Draw prior to antibiotics [[id number]] Collected: 03/08/24 0047    Order Status: Completed Specimen: Blood Updated: 03/09/24 0200     CULTURE, BLOOD ([facility]) No Growth At 24 Hours    Blood Culture x two cultures. Draw prior to antibiotics [[id number]] Collected: 03/08/24 0100    Order Status: Completed Specimen: Blood Updated: 03/09/24 0200     CULTURE, BLOOD ([facility]) No Growth At 24 Hours               DIAGNOSTICS   Echo    Overall the study quality was technically difficult. The study was   difficult due to patient's uncooperativeness, clinical status, body   habitus and heart rhythm.    Left Ventricle: The left ventricle is normal in size. There is mild   concentric hypertrophy. There is normal systolic function with a visually   estimated ejection fraction of 60 - 65%. Grade I diastolic dysfunction.    Right Ventricle: Normal right ventricular cavity size. Systolic   function is normal.    Less than mild valvular stenosis/regurgitation appreciated.    Unable to determine PASP.  CT Abdomen Pelvis  Without Contrast  Narrative: EXAMINATION:  CT ABDOMEN PELVIS WITHOUT CONTRAST    CLINICAL HISTORY:  Abdominal pain, acute, nonlocalized;    TECHNIQUE:  Low dose axial images, sagittal and coronal reformations were obtained from the lung bases to the pubic symphysis, Oral contrast was not administered.    Total DLP: 820 mGy.cm    Automatic exposure control was utilized to reduce the patient's dose    COMPARISON:  None    FINDINGS:  Examination is limited due to patient motion.    Heart: Normal in size. No pericardial effusion.    Lung Bases: Well aerated, without consolidation or pleural fluid.    Liver: Normal in size and attenuation, with no focal hepatic lesions.    Gallbladder: No calcified gallstones.    Bile Ducts: No evidence of dilated ducts.    Pancreas: No mass or peripancreatic fat stranding.    Spleen: The spleen appears atrophic    Adrenals: Unremarkable.    Kidneys/ Ureters: Unremarkable.    Bladder: The bladder is decompressed with a Chiu catheter.    Reproductive organs: The uterus is surgically absent.  No adnexal masses are seen    GI Tract/Mesentery: The stomach appears unremarkable.  The small bowel is normal in caliber throughout.  No mucosal thickening or discrete masses are identified.  The appendix is not discretely identified.  Mild constipation is noted.  No obstruction is  seen.    Peritoneal Space: No ascites. No free air.    Retroperitoneum: No significant adenopathy.    Abdominal wall: Unremarkable.    Vasculature: No significant atherosclerosis or aneurysm.    Bones: Postoperative changes are identified in the spine.  No acute bony abnormalities are seen.  Impression: Limited examination due to patient motion demonstrates no acute abnormalities.    Postoperative changes are identified in the spine.    Mild constipation is noted.    Electronically signed by: Lio Stroud  Date:    03/08/2024  Time:    18:53  X-Ray Chest AP Portable  Narrative: EXAMINATION:  XR CHEST AP PORTABLE    CLINICAL HISTORY:  Dyspnea on exertion and hypoxia the right greater left lungs are hypoaerated with sepsis and altered mental status.    TECHNIQUE:  Single frontal view of the chest was performed.    COMPARISON:  Comparison study report but no images from outside x-ray performed on 06/06/2023.    FINDINGS:  The right greater left lungs are hypoaerated.  There are hazy bandlike opacities in the right lung base more prominent laterally.  No suspicious interstitial or alveolar opacities, lung nodule/mass, effusion, or pneumothorax is present.  The heart is partially obscured with suspected cardiomegaly.  The aortic arch appears prominent on the left.  There are postsurgical changes with spinal stabilization rods in the bilateral posterior thoracic spine extending caudally out of the field of view.  Impression: 1. Right greater left hypoaeration with bandlike opacities in the right lung base that most resembles subsegmental/discoid atelectasis versus pleuroparenchymal scarring.  2. Prominent of left-side of aortic arch may be due to ectatic aorta accentuated by rotation to the right, but the thoracic aortic aneurysm cannot be excluded.  This can be further evaluated with a CT angiogram of the chest.    Electronically signed by: Christophe Barnett MD  Date:    03/08/2024  Time:    10:20  US Abdomen  Limited_Liver  Narrative: RIGHT UPPER QUADRANT ULTRASOUND:    CPT: 94161    HISTORY: Elevated liver function tests.    Comparison: None.    FINDINGS:    Multiple transverse and sagittal grayscale sonographic images were acquired through the abdomen. Artifact from bowel gas and body habitus limits this exam with the pancreas mostly obscured and the liver partially obscured.  The visualized portion of the liver measures 15.1 cm in the craniocaudal midclavicular line.  The visualized liver is diffusely increased in echotexture. This increased echotexture decreases sensitivity to detect focal lesions due to decreased penetration. No focal lesions are identified in the partially visualized liver parenchyma.  The main portal vein is patent with hepatopetal flow.  There is sludge in the gallbladder, and there is gallbladder wall thickening to 0.35 cm.  There is no visible cholelithiasis, pericholecystic fluid, or sonographic 's sign. The common bile duct is unremarkable and measures 0.49 cm in its greatest AP diameter.  No images were obtained of the pancreas or right kidney.  Impression: 1. Sludge in gallbladder with gallbladder wall thickening.  Cholecystitis cannot be excluded.  2. Hepatic steatosis.    Electronically signed by: Christophe Barnett MD  Date:    03/08/2024  Time:    09:10        ASSESSMENT   Thyroid derangement- hypothyroidism  Hypokalemia  Encephalopathy- multifactorial  Elevated CPK    PLAN       Patient very agitated with several episodes diaphoresis overnight chemical sedation with several agents unsucessful  Now sedated on precedex drip in ICU  Spoke with family no hx psych, complete coherant last Friday Saturday behavioral issues was sent to [facility] from [facility]  Tried several sedative meds without any improvement  Suspect not myedema coma, apparent working dx coming into buliding  But does appear to be in some type of thyroid storm/derangement  Seem to be resting comfortably this  morning on precedex drip  Very hypertensive and tachycardiac overngiht  TSH back down to 4 from 24 overnight, T4 slight elevated from normal 1 day ago T3 low  Spoke with daughter agree with shadisfan for [facility]- endocrinolgy, neurology    Prophylaxis: lovenox       Critical care time spent on the evaluation and treatment of severe organ dysfunction, review of pertinent labs and imaging studies, discussions with consulting providers and discussions with patient/family 45 minutes     Pradip Matthew MD  [facility]

## 2024-03-09 NOTE — NURSING
Spoke with dr kelly about potassium result, received telephone for potassium 10 meq rider x6 doses

## 2024-03-09 NOTE — NURSING
Phoned [facility] at [phone], spoke to Vidhya, to request transfer for endocrinology and neurology services.  Requests that I fax records to her for case management to review.

## 2024-03-10 LAB
ALBUMIN SERPL-MCNC: 3 G/DL (ref 3.4–5)
ALBUMIN/GLOB SERPL: 0.8 RATIO
ALP SERPL-CCNC: 129 UNIT/L (ref 50–144)
ALT SERPL-CCNC: 53 UNIT/L (ref 1–45)
ANION GAP SERPL CALC-SCNC: 4 MEQ/L (ref 2–13)
AST SERPL-CCNC: 97 UNIT/L (ref 14–36)
BASOPHILS # BLD AUTO: 0.06 X10(3)/MCL (ref 0.01–0.08)
BASOPHILS NFR BLD AUTO: 0.8 % (ref 0.1–1.2)
BILIRUB SERPL-MCNC: 0.5 MG/DL (ref 0–1)
BUN SERPL-MCNC: 12 MG/DL (ref 7–20)
CALCIUM SERPL-MCNC: 8.8 MG/DL (ref 8.4–10.2)
CERULOPLASMIN SERPL-MCNC: 29.5 MG/DL (ref 20–51)
CHLORIDE SERPL-SCNC: 112 MMOL/L (ref 98–110)
CO2 SERPL-SCNC: 27 MMOL/L (ref 21–32)
CREAT SERPL-MCNC: 0.58 MG/DL (ref 0.66–1.25)
CREAT/UREA NIT SERPL: 21 (ref 12–20)
EOSINOPHIL # BLD AUTO: 0.08 X10(3)/MCL (ref 0.04–0.36)
EOSINOPHIL NFR BLD AUTO: 1 % (ref 0.7–7)
ERYTHROCYTE [DISTWIDTH] IN BLOOD BY AUTOMATED COUNT: 15.3 % (ref 11–14.5)
GFR SERPLBLD CREATININE-BSD FMLA CKD-EPI: >90 MLS/MIN/1.73/M2
GLOBULIN SER-MCNC: 3.8 GM/DL (ref 2–3.9)
GLUCOSE SERPL-MCNC: 152 MG/DL (ref 70–115)
HCT VFR BLD AUTO: 33.4 % (ref 36–48)
HGB BLD-MCNC: 11.2 G/DL (ref 11.8–16)
IMM GRANULOCYTES # BLD AUTO: 0.06 X10(3)/MCL (ref 0–0.03)
IMM GRANULOCYTES NFR BLD AUTO: 0.8 % (ref 0–0.5)
LYMPHOCYTES # BLD AUTO: 1.99 X10(3)/MCL (ref 1.16–3.74)
LYMPHOCYTES NFR BLD AUTO: 25 % (ref 20–55)
MAGNESIUM SERPL-MCNC: 2.1 MG/DL (ref 1.8–2.4)
MCH RBC QN AUTO: 31.5 PG (ref 27–34)
MCHC RBC AUTO-ENTMCNC: 33.5 G/DL (ref 31–37)
MCV RBC AUTO: 94.1 FL (ref 79–99)
MONOCYTES # BLD AUTO: 0.58 X10(3)/MCL (ref 0.24–0.36)
MONOCYTES NFR BLD AUTO: 7.3 % (ref 4.7–12.5)
NEUTROPHILS # BLD AUTO: 5.19 X10(3)/MCL (ref 1.56–6.13)
NEUTROPHILS NFR BLD AUTO: 65.1 % (ref 37–73)
NRBC BLD AUTO-RTO: 0.3 %
PLATELET # BLD AUTO: 306 X10(3)/MCL (ref 140–371)
PMV BLD AUTO: 10.9 FL (ref 9.4–12.4)
POCT GLUCOSE: 108 MG/DL (ref 70–110)
POCT GLUCOSE: 116 MG/DL (ref 70–110)
POTASSIUM SERPL-SCNC: 3.1 MMOL/L (ref 3.5–5.1)
POTASSIUM SERPL-SCNC: 3.7 MMOL/L (ref 3.5–5.1)
PROT SERPL-MCNC: 6.8 GM/DL (ref 6.3–8.2)
RBC # BLD AUTO: 3.55 X10(6)/MCL (ref 4–5.1)
SODIUM SERPL-SCNC: 143 MMOL/L (ref 135–145)
T4 FREE SERPL-MCNC: 1.91 NG/DL (ref 0.78–2.19)
TSH SERPL-ACNC: 8.51 UIU/ML (ref 0.36–3.74)
WBC # SPEC AUTO: 7.96 X10(3)/MCL (ref 4–11.5)

## 2024-03-10 PROCEDURE — 84443 ASSAY THYROID STIM HORMONE: CPT | Performed by: FAMILY MEDICINE

## 2024-03-10 PROCEDURE — 85025 COMPLETE CBC W/AUTO DIFF WBC: CPT | Performed by: FAMILY MEDICINE

## 2024-03-10 PROCEDURE — 99900035 HC TECH TIME PER 15 MIN (STAT)

## 2024-03-10 PROCEDURE — 25000003 PHARM REV CODE 250: Performed by: FAMILY MEDICINE

## 2024-03-10 PROCEDURE — 63600175 PHARM REV CODE 636 W HCPCS: Performed by: FAMILY MEDICINE

## 2024-03-10 PROCEDURE — 84439 ASSAY OF FREE THYROXINE: CPT | Performed by: FAMILY MEDICINE

## 2024-03-10 PROCEDURE — 20000000 HC ICU ROOM

## 2024-03-10 PROCEDURE — 83735 ASSAY OF MAGNESIUM: CPT | Performed by: FAMILY MEDICINE

## 2024-03-10 PROCEDURE — 80053 COMPREHEN METABOLIC PANEL: CPT | Performed by: FAMILY MEDICINE

## 2024-03-10 PROCEDURE — 94761 N-INVAS EAR/PLS OXIMETRY MLT: CPT

## 2024-03-10 PROCEDURE — C9113 INJ PANTOPRAZOLE SODIUM, VIA: HCPCS | Performed by: FAMILY MEDICINE

## 2024-03-10 PROCEDURE — 27000221 HC OXYGEN, UP TO 24 HOURS

## 2024-03-10 PROCEDURE — 25000003 PHARM REV CODE 250

## 2024-03-10 PROCEDURE — 36415 COLL VENOUS BLD VENIPUNCTURE: CPT | Performed by: FAMILY MEDICINE

## 2024-03-10 PROCEDURE — 84132 ASSAY OF SERUM POTASSIUM: CPT | Performed by: FAMILY MEDICINE

## 2024-03-10 RX ORDER — POTASSIUM CHLORIDE 7.45 MG/ML
10 INJECTION INTRAVENOUS
Status: COMPLETED | OUTPATIENT
Start: 2024-03-10 | End: 2024-03-10

## 2024-03-10 RX ORDER — PANTOPRAZOLE SODIUM 40 MG/10ML
40 INJECTION, POWDER, LYOPHILIZED, FOR SOLUTION INTRAVENOUS DAILY
Status: DISCONTINUED | OUTPATIENT
Start: 2024-03-10 | End: 2024-03-11 | Stop reason: HOSPADM

## 2024-03-10 RX ORDER — DEXTROSE MONOHYDRATE, SODIUM CHLORIDE, AND POTASSIUM CHLORIDE 50; 1.49; 4.5 G/1000ML; G/1000ML; G/1000ML
INJECTION, SOLUTION INTRAVENOUS CONTINUOUS
Status: DISCONTINUED | OUTPATIENT
Start: 2024-03-10 | End: 2024-03-11 | Stop reason: HOSPADM

## 2024-03-10 RX ADMIN — MORPHINE SULFATE 2 MG: 2 INJECTION, SOLUTION INTRAMUSCULAR; INTRAVENOUS at 03:03

## 2024-03-10 RX ADMIN — DEXMEDETOMIDINE HYDROCHLORIDE 0.5 MCG/KG/HR: 400 INJECTION INTRAVENOUS at 07:03

## 2024-03-10 RX ADMIN — METRONIDAZOLE 500 MG: 5 INJECTION, SOLUTION INTRAVENOUS at 03:03

## 2024-03-10 RX ADMIN — MUPIROCIN: 20 OINTMENT TOPICAL at 08:03

## 2024-03-10 RX ADMIN — MUPIROCIN: 20 OINTMENT TOPICAL at 09:03

## 2024-03-10 RX ADMIN — MORPHINE SULFATE 60 MG: 15 TABLET, FILM COATED, EXTENDED RELEASE ORAL at 08:03

## 2024-03-10 RX ADMIN — METRONIDAZOLE 500 MG: 5 INJECTION, SOLUTION INTRAVENOUS at 06:03

## 2024-03-10 RX ADMIN — POTASSIUM CHLORIDE 10 MEQ: 7.46 INJECTION, SOLUTION INTRAVENOUS at 12:03

## 2024-03-10 RX ADMIN — WHITE PETROLATUM: 1.75 OINTMENT TOPICAL at 08:03

## 2024-03-10 RX ADMIN — POTASSIUM CHLORIDE 10 MEQ: 7.46 INJECTION, SOLUTION INTRAVENOUS at 01:03

## 2024-03-10 RX ADMIN — POTASSIUM CHLORIDE 10 MEQ: 7.46 INJECTION, SOLUTION INTRAVENOUS at 11:03

## 2024-03-10 RX ADMIN — DEXTROSE MONOHYDRATE, SODIUM CHLORIDE, AND POTASSIUM CHLORIDE: 50; 4.5; 1.49 INJECTION, SOLUTION INTRAVENOUS at 06:03

## 2024-03-10 RX ADMIN — MINOCYCLINE HYDROCHLORIDE 100 MG: 100 CAPSULE ORAL at 08:03

## 2024-03-10 RX ADMIN — DEXTROSE MONOHYDRATE, SODIUM CHLORIDE, AND POTASSIUM CHLORIDE: 50; 9; 2.98 INJECTION, SOLUTION INTRAVENOUS at 07:03

## 2024-03-10 RX ADMIN — DEXTROSE MONOHYDRATE 2 G: 5 INJECTION INTRAVENOUS at 03:03

## 2024-03-10 RX ADMIN — HYDRALAZINE HYDROCHLORIDE 10 MG: 20 INJECTION INTRAMUSCULAR; INTRAVENOUS at 04:03

## 2024-03-10 RX ADMIN — METRONIDAZOLE 500 MG: 5 INJECTION, SOLUTION INTRAVENOUS at 10:03

## 2024-03-10 RX ADMIN — POTASSIUM CHLORIDE 10 MEQ: 7.46 INJECTION, SOLUTION INTRAVENOUS at 10:03

## 2024-03-10 RX ADMIN — PANTOPRAZOLE SODIUM 40 MG: 40 INJECTION, POWDER, LYOPHILIZED, FOR SOLUTION INTRAVENOUS at 09:03

## 2024-03-10 RX ADMIN — DEXMEDETOMIDINE HYDROCHLORIDE 0.6 MCG/KG/HR: 400 INJECTION INTRAVENOUS at 11:03

## 2024-03-10 RX ADMIN — WHITE PETROLATUM: 1.75 OINTMENT TOPICAL at 09:03

## 2024-03-10 RX ADMIN — DEXMEDETOMIDINE HYDROCHLORIDE 0.9 MCG/KG/HR: 400 INJECTION INTRAVENOUS at 07:03

## 2024-03-10 RX ADMIN — DEXMEDETOMIDINE HYDROCHLORIDE 0.9 MCG/KG/HR: 400 INJECTION INTRAVENOUS at 01:03

## 2024-03-10 NOTE — NURSING
Phoned [facility] at [phone] to request transfer for neurology services.  Informed that they are currently on diversion for neurology and have patients on a waiting list.

## 2024-03-10 NOTE — NURSING
Phoned [facility] at [phone] to request transfer for neurology services.  Told by house supervisor that they do not have neurology services on call.
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
## 2024-03-10 NOTE — PROGRESS NOTES
Hospital Medicine  Progress Note    Patient Name: Tiffany Webber  MRN: [id number]  Status: IP- Inpatient   Admission Date: 3/7/2024  Length of Stay: 2  Date of Service: 03/10/2024       CC: hospital follow-up for        SUBJECTIVE   63-year-old female sent from [facility] for altered mental status for the past two days. The patient is completely altered and jittery with involuntary shaking movements and is unable to give any history. She does not answer questions appropriately. The geriatric psychiatric center ordered a head CT on arrival which was negative. Her TSH is found to be 24,000 here. T4 is 1.45. T3 is unavailable because these are sent out. She was started on Zyprexa apparently due to hallucinations at the psychiatric facility. She does have significant edema in the bilateral lower extremities. 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 She was given IV levothyroxine in the emergency department.       03/09/2024  Patient very agitated with several episodes diaphoresis overnight chemical sedation with several agents unsucessful  Spoke with family no hx psych, complete coherant last Friday Saturday behavioral issues was sent to [facility] from [facility]  Tried several sedative meds without any improvement  Suspect not myedema coma apparent working dx coming into buliding  But does appear to be in some type of thyroid storm/derangement  Seem to be resting comfortably this morning on precedex drip  Very hypertensive and tachycardiac overngiht  TSH back down to 4 from 24 overnight, T4 normal range T3 low  Spoke with daughter agree with donna for [facility]- endocrinolgy, neurology    03/10/2024  Remain sedated on precedex drip 0.9 mcg  Unable to wean   Spoke with endocrinology [place] last night  Review case do not think patient symptom related to thyroid dysfunction  Rec look elsewhere  Lab stable  Replace K  Blood culture negative  Daughter updated  Will try to get neurology eval- transfer  center      Today: Patient seen and examined at bedside, and chart reviewed.       MEDICATIONS   Scheduled   acetaZOLAMIDE  250 mg Oral Daily    cefTRIAXone (Rocephin) IV (PEDS and ADULTS)  2 g Intravenous Q24H    DULoxetine  60 mg Oral Daily    enoxparin  40 mg Subcutaneous Q24H (prophylaxis, 1700)    metronidazole  500 mg Intravenous Q8H    minocycline  100 mg Oral Q12H    morphine  60 mg Oral Q12H    mupirocin   Nasal BID    pantoprazole  40 mg Intravenous Daily    potassium chloride  10 mEq Intravenous Q1H    white petrolatum   Topical (Top) Q12H     Continuous Infusions   dexmedeTOMIDine (Precedex) infusion (titrating) 0.6 mcg/kg/hr (03/10/24 1152)    dextrose 5 % and 0.9 % NaCl with KCl 40 mEq 75 mL/hr at 03/10/24 0730         PHYSICAL EXAM   VITALS: T 96.5 °F (35.8 °C)   BP (!) 157/74   P (!) 59   RR 14   O2 97 %      GENERAL: obese, resting in bed, sedated on precedex drip  LUNGS: CTA B/L  CVS: Normal rate  GI/: Soft, NT, bowel sounds positive.  EXTREMITIES: No peripheral edema  NEURO: sedated    LABS   CBC  Recent Labs     03/09/24  0913 03/10/24  0344   WBC 10.63 7.96   RBC 2.99* 3.55*   HGB 9.5* 11.2*   HCT 28.4* 33.4*   MCV 95.0 94.1   MCH 31.8 31.5   MCHC 33.5 33.5   RDW 15.5* 15.3*    306     CHEM  Recent Labs     03/08/24  0029 03/08/24  0330 03/09/24  0913 03/09/24  1533 03/10/24  0344      < > 141  --  143   K 3.2*   < > 2.9* 3.0* 3.1*   CHLORIDE 104   < > 108  --  112*   CO2 36*   < > 28  --  27   BUN 15.0   < > 14.0  --  12.0   CREATININE 0.86   < > 0.65*  --  0.58*   GLUCOSE 93   < > 94  --  152*   CALCIUM 8.7   < > 8.3* 8.1* 8.8   MG 2.20  --   --   --  2.10   ALBUMIN 3.3*   < > 2.9*  --  3.0*   GLOBULIN 4.1*   < > 3.8  --  3.8   ALKPHOS 142   < > 115  --  129   ALT 66*   < > 55*  --  53*   *   < > 117*  --  97*   BILITOT 0.8   < > 0.6  --  0.5   TSH 23.800*   < >  --   --  8.510*    < > = values in this interval not displayed.         MICROBIOLOGY     Microbiology Results  (last 7 days)       Procedure Component Value Units Date/Time    Blood Culture x two cultures. Draw prior to antibiotics [[id number]] Collected: 03/08/24 0047    Order Status: Completed Specimen: Blood Updated: 03/10/24 0300     CULTURE, BLOOD ([facility]) No Growth At 48 Hours    Blood Culture x two cultures. Draw prior to antibiotics [[id number]] Collected: 03/08/24 0100    Order Status: Completed Specimen: Blood Updated: 03/10/24 0300     CULTURE, BLOOD ([facility]) No Growth At 48 Hours              DIAGNOSTICS   US Thyroid  Narrative: EXAMINATION:  US THYROID    CLINICAL HISTORY:  abnormal thyroid studies; hypothyroid    COMPARISON:  None.    FINDINGS:  Right thyroid lobe measures 1.4 x 0.6 x 1.0 cm.  Left thyroid lobe measures 2.1 x 0.8 x 0.6 cm.  Diffuse atrophy of the thyroid.  Isthmus measures 0.3 cm.  Small cystic-appearing nodule measuring approximately 6 mm in diameter, TR 1.  Impression: Small, hypovascular atrophic thyroid.  No suspicious nodules.    Electronically signed by: Ric Norman  Date:    03/09/2024  Time:    14:24  X-Ray Chest AP Portable  Narrative: EXAMINATION:  XR CHEST AP PORTABLE    CLINICAL HISTORY:  sob;    TECHNIQUE:  Single frontal view of the chest was performed.    COMPARISON:  March 8, 2024    FINDINGS:  Shallow inspiration was obtained.  The cardiomediastinal silhouette remains unchanged.  Tortuosity and ectasia of the thoracic aorta again noted.  Basilar hypoaeration is seen, accentuated by the expiratory nature of this examination.  No pleural effusion or pneumothorax are seen.  Postoperative changes are again identified in the spine.  Impression: Basilar hypoaeration is identify, accentuated by shallow inspiration.    Electronically signed by: Lio Stroud  Date:    03/09/2024  Time:    09:23        ASSESSMENT   Encephalopathy- multifactorial  hypothyroidism  Hypokalemia  Elevated CPK  H/O thoracic spinal fracture status post multiple spinal surgeries 2023  HTN  HLD  type 2  diabetes  chronic hepatitis C  depression     PLAN     Patient very agitated with several episodes diaphoresis overnight chemical sedation with several agents unsucessful  Now sedated on precedex drip in ICU  Spoke with family no hx psych, complete coherant last Friday Saturday behavioral issues was sent to nursing home from [facility]  Tried several sedative meds without any improvement  Suspect not myedema coma, apparent working dx coming into buliding  But does appear to be in some type of thyroid storm/derangement  Seem to be resting comfortably this morning on precedex drip  Very hypertensive and tachycardiac overngiht  TSH back down to 4 from 24 overnight, T4 slight elevated from normal 1 day ago T3 low  Spoke with daughter agree with donna for [facility]- endocrinolgy, neurology     03/10/2024  Cont ivf  Iv abx  Precedex drip  Wean if tolerated  Spoke with endocrinology [place] last night  Review case do not think patient symptom related to thyroid dysfunction  Rec look elsewhere  Lab stable  Replace K  Blood culture negative  Daughter updated  Will try to get neurology eval- transfer center  MRI Brain liliam      Critical care time spent on the evaluation and treatment of severe organ dysfunction, review of pertinent labs and imaging studies, discussions with consulting providers and discussions with patient/family 38 minutes       Pradip Matthew MD  [facility]

## 2024-03-10 NOTE — PLAN OF CARE
Problem: Adult Inpatient Plan of Care  Goal: Plan of Care Review  3/10/2024 1511 by Ramila Ceja RN  Flowsheets (Taken 3/10/2024 1511)  Plan of Care Reviewed With:   patient   daughter  3/10/2024 1511 by Ramila Ceja RN  Outcome: Ongoing, Progressing  Goal: Patient-Specific Goal (Individualized)  Outcome: Ongoing, Progressing  Goal: Absence of Hospital-Acquired Illness or Injury  Outcome: Ongoing, Progressing  Goal: Optimal Comfort and Wellbeing  Outcome: Ongoing, Progressing  Goal: Readiness for Transition of Care  Outcome: Ongoing, Progressing     Problem: Bariatric Environmental Safety  Goal: Safety Maintained with Care  Outcome: Ongoing, Progressing     Problem: Infection  Goal: Absence of Infection Signs and Symptoms  Outcome: Ongoing, Progressing     Problem: Impaired Wound Healing  Goal: Optimal Wound Healing  Outcome: Ongoing, Progressing     Problem: Skin Injury Risk Increased  Goal: Skin Health and Integrity  Outcome: Ongoing, Progressing

## 2024-03-10 NOTE — NURSING
Faxed face sheet, H & P, progress note, imaging results, and current labs to [facility] at [phone], as requested.  Will wait for call back.

## 2024-03-10 NOTE — NURSING
Phoned [facility] at [phone] to request transfer for neurology services.  Spoke to Priscilla, who requested that I fax records to her for review.

## 2024-03-10 NOTE — NURSING
Lab at bedside for blood draw, patient screams out when being touched or repositioned, heart rate and blood pressure increase until left alone

## 2024-03-11 VITALS
HEART RATE: 70 BPM | TEMPERATURE: 99 F | RESPIRATION RATE: 15 BRPM | OXYGEN SATURATION: 96 % | WEIGHT: 248.69 LBS | HEIGHT: 64 IN | SYSTOLIC BLOOD PRESSURE: 126 MMHG | DIASTOLIC BLOOD PRESSURE: 72 MMHG | BODY MASS INDEX: 42.46 KG/M2

## 2024-03-11 LAB — POCT GLUCOSE: 115 MG/DL (ref 70–110)

## 2024-03-11 PROCEDURE — 25000003 PHARM REV CODE 250: Performed by: FAMILY MEDICINE

## 2024-03-11 PROCEDURE — 63600175 PHARM REV CODE 636 W HCPCS: Performed by: FAMILY MEDICINE

## 2024-03-11 RX ADMIN — DEXMEDETOMIDINE HYDROCHLORIDE 0.5 MCG/KG/HR: 400 INJECTION INTRAVENOUS at 12:03

## 2024-03-11 RX ADMIN — DEXTROSE MONOHYDRATE, SODIUM CHLORIDE, AND POTASSIUM CHLORIDE: 50; 4.5; 1.49 INJECTION, SOLUTION INTRAVENOUS at 02:03

## 2024-03-11 NOTE — NURSING
Spoke with reynaldo de anda from [facility], asked some questions, went over some lab results, her h and p as well as vitals,

## 2024-03-11 NOTE — NURSING
Spoke with lam hendrix from [facility], was given the ok to transfer, will go to surgical icu bed number 8, dr. Ric perry is accepting , address [address] [place] [address]

## 2024-03-11 NOTE — NURSING
Jacob at bedside for transport, patient information packet and cd with radiological imaging set with ems, called placido at [facility] to inform her of patient leaving

## 2024-03-11 NOTE — NURSING
Spoke with patients daughter renee, informed her of the transfer, gave her the address, room number and phone number to icu in [facility]

## 2024-03-13 LAB
BACTERIA BLD CULT: NORMAL
BACTERIA BLD CULT: NORMAL

## 2024-03-15 NOTE — DISCHARGE SUMMARY
Hospital Medicine  Discharge Summary    Patient Name: Tiffany Webber  MRN: [id number]  Admit Date: 3/7/2024  Discharge Date: 3/11/2024   Status: IP- Inpatient   Length of Stay: 3      PHYSICIANS   Admitting Physician: Pradip Matthew MD  Discharging Physician: Pradip Matthew MD.  Primary Care Physician: Magi, Primary Doctor        DISCHARGE DIAGNOSES   Encephalopathy- multifactorial  hypothyroidism  Hypokalemia  Elevated CPK  H/O thoracic spinal fracture status post multiple spinal surgeries 2023  HTN  HLD  type 2 diabetes  chronic hepatitis C  depression       PROCEDURES   * No surgery found *         HOSPITAL COURSE    63-year-old female sent from [facility] for altered mental status for the past two days. The patient is completely altered and jittery with involuntary shaking movements and is unable to give any history. She does not answer questions appropriately. [facility] head CT on arrival which was negative. Her TSH is found to be 24,000 here. T4 is 1.45. T3 is unavailable because these are sent out. She was started on Zyprexa apparently due to hallucinations at the psychiatric facility. She does have significant edema in the bilateral lower extremities. 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 She was given IV levothyroxine in the emergency department.       03/09/2024  Patient very agitated with several episodes diaphoresis overnight chemical sedation with several agents unsucessful  Spoke with family no hx psych, complete coherant last Friday Saturday behavioral issues was sent to [facility] from [facility]  Tried several sedative meds without any improvement  Suspect not myedema coma apparent working dx coming into buliding  But does appear to be in some type of thyroid storm/derangement  Seem to be resting comfortably this morning on precedex drip  Very hypertensive and tachycardiac overngiht  TSH back down to 4 from 24 overnight, T4 normal range T3 low  Spoke with  daughter agree with donna for [facility]- endocrinolgy, neurology     03/10/2024  Remain sedated on precedex drip 0.9 mcg  Unable to wean   Spoke with endocrinology [place] last night  Review case do not think patient symptom related to thyroid dysfunction  Rec look elsewhere  Lab stable  Replace K  Blood culture negative  Daughter updated  Will try to get neurology eval- transfer center      STATUS  Stable    DISPOSITION  Discharge to Transfer [facility]- neurology [facility] tony cortés    DIET  npo    ACTIVITY  As tolerated      FOLLOW-UP         DISCHARGE MEDICATION RECONCILIATION        Medication List        ASK your doctor about these medications      acetaZOLAMIDE 250 MG tablet  Commonly known as: DIAMOX     DULoxetine 60 MG capsule  Commonly known as: CYMBALTA     fluocinonide 0.05 % external solution  Commonly known as: LIDEX     furosemide 40 MG tablet  Commonly known as: LASIX     levothyroxine 125 MCG tablet  Commonly known as: SYNTHROID     melatonin 3 mg Cap     minocycline 100 MG tablet  Commonly known as: DYNACIN     morphine 60 MG 12 hr tablet  Commonly known as: MS CONTIN     OLANZapine 5 MG tablet  Commonly known as: ZyPREXA     oxyCODONE 5 mg Cap  Commonly known as: OXY-IR     pantoprazole 40 MG tablet  Commonly known as: PROTONIX     tiZANidine 4 mg Cap     triamcinolone acetonide 0.1% 0.1 % Lotn  Commonly known as: KENALOG                PHYSICAL EXAM   VITALS: T 98.7 °F (37.1 °C)   /72   P 70   RR 15   O2 96 %      GENERAL: obese, resting in bed, sedated on precedex drip  LUNGS: CTA B/L  CVS: Normal rate  GI/: Soft, NT, bowel sounds positive.  EXTREMITIES: No peripheral edema  NEURO: sedated      DIAGNOSITCS   CBC:   Recent Labs   Lab 03/09/24  0402 03/09/24  0913 03/10/24  0344   WBC 9.88 10.63 7.96   HGB 8.8* 9.5* 11.2*   HCT 26.7* 28.4* 33.4*    280 306       CMP:   Recent Labs   Lab 03/09/24  0402 03/09/24  0913 03/09/24  1533 03/10/24  0342  "03/10/24  1645   CALCIUM 8.2* 8.3* 8.1* 8.8  --    ALBUMIN 2.5* 2.9*  --  3.0*  --     141  --  143  --    K 2.4* 2.9* 3.0* 3.1* 3.7   CO2 30 28  --  27  --    BUN 15.0 14.0  --  12.0  --    CREATININE 0.65* 0.65*  --  0.58*  --    ALKPHOS 97 115  --  129  --    ALT 50* 55*  --  53*  --    * 117*  --  97*  --    BILITOT 0.5 0.6  --  0.5  --    MG  --   --   --  2.10  --      Estimated Creatinine Clearance: 122.1 mL/min (A) (based on SCr of 0.58 mg/dL (L)).    Labs within the past 24 hours have been reviewed.     COAG:  No results for input(s): "APTT", "INR", "PTT" in the last 168 hours.    CARDIAC ENZYMES: No results for input(s): "TROPONINI", "CPK", "CKMB" in the last 72 hours.     No results for input(s): "AMYLASE", "LIPASE" in the last 168 hours.    No results for input(s): "POCTGLUCOSE" in the last 72 hours.     Microbiology Results (last 7 days)       ** No results found for the last 168 hours. **             No results for input(s): "CHOL", "TRIG", "HDL", "LDL" in the last 72 hours. No results found for: "LABA1C", "HGBA1C"     US Thyroid    Result Date: 3/9/2024  EXAMINATION: US THYROID CLINICAL HISTORY: abnormal thyroid studies; hypothyroid COMPARISON: None. FINDINGS: Right thyroid lobe measures 1.4 x 0.6 x 1.0 cm.  Left thyroid lobe measures 2.1 x 0.8 x 0.6 cm.  Diffuse atrophy of the thyroid.  Isthmus measures 0.3 cm.  Small cystic-appearing nodule measuring approximately 6 mm in diameter, TR 1.     Small, hypovascular atrophic thyroid.  No suspicious nodules. Electronically signed by: Ric Norman Date:    03/09/2024 Time:    14:24    X-Ray Chest AP Portable    Result Date: 3/9/2024  EXAMINATION: XR CHEST AP PORTABLE CLINICAL HISTORY: sob; TECHNIQUE: Single frontal view of the chest was performed. COMPARISON: March 8, 2024 FINDINGS: Shallow inspiration was obtained.  The cardiomediastinal silhouette remains unchanged.  Tortuosity and ectasia of the thoracic aorta again noted.  Basilar " hypoaeration is seen, accentuated by the expiratory nature of this examination.  No pleural effusion or pneumothorax are seen.  Postoperative changes are again identified in the spine.     Basilar hypoaeration is identify, accentuated by shallow inspiration. Electronically signed by: Lio Stroud Date:    03/09/2024 Time:    09:23    Echo    Result Date: 3/8/2024    Overall the study quality was technically difficult. The study was difficult due to patient's uncooperativeness, clinical status, body habitus and heart rhythm.   Left Ventricle: The left ventricle is normal in size. There is mild concentric hypertrophy. There is normal systolic function with a visually estimated ejection fraction of 60 - 65%. Grade I diastolic dysfunction.   Right Ventricle: Normal right ventricular cavity size. Systolic function is normal.   Less than mild valvular stenosis/regurgitation appreciated.   Unable to determine PASP.     CT Abdomen Pelvis  Without Contrast    Result Date: 3/8/2024  EXAMINATION: CT ABDOMEN PELVIS WITHOUT CONTRAST CLINICAL HISTORY: Abdominal pain, acute, nonlocalized; TECHNIQUE: Low dose axial images, sagittal and coronal reformations were obtained from the lung bases to the pubic symphysis, Oral contrast was not administered. Total DLP: 820 mGy.cm Automatic exposure control was utilized to reduce the patient's dose COMPARISON: None FINDINGS: Examination is limited due to patient motion. Heart: Normal in size. No pericardial effusion. Lung Bases: Well aerated, without consolidation or pleural fluid. Liver: Normal in size and attenuation, with no focal hepatic lesions. Gallbladder: No calcified gallstones. Bile Ducts: No evidence of dilated ducts. Pancreas: No mass or peripancreatic fat stranding. Spleen: The spleen appears atrophic Adrenals: Unremarkable. Kidneys/ Ureters: Unremarkable. Bladder: The bladder is decompressed with a Chiu catheter. Reproductive organs: The uterus is surgically absent.  No  adnexal masses are seen GI Tract/Mesentery: The stomach appears unremarkable.  The small bowel is normal in caliber throughout.  No mucosal thickening or discrete masses are identified.  The appendix is not discretely identified.  Mild constipation is noted.  No obstruction is seen. Peritoneal Space: No ascites. No free air. Retroperitoneum: No significant adenopathy. Abdominal wall: Unremarkable. Vasculature: No significant atherosclerosis or aneurysm. Bones: Postoperative changes are identified in the spine.  No acute bony abnormalities are seen.     Limited examination due to patient motion demonstrates no acute abnormalities. Postoperative changes are identified in the spine. Mild constipation is noted. Electronically signed by: Lio Stroud Date:    03/08/2024 Time:    18:53    X-Ray Chest AP Portable    Result Date: 3/8/2024  EXAMINATION: XR CHEST AP PORTABLE CLINICAL HISTORY: Dyspnea on exertion and hypoxia the right greater left lungs are hypoaerated with sepsis and altered mental status. TECHNIQUE: Single frontal view of the chest was performed. COMPARISON: Comparison study report but no images from outside x-ray performed on 06/06/2023. FINDINGS: The right greater left lungs are hypoaerated.  There are hazy bandlike opacities in the right lung base more prominent laterally.  No suspicious interstitial or alveolar opacities, lung nodule/mass, effusion, or pneumothorax is present.  The heart is partially obscured with suspected cardiomegaly.  The aortic arch appears prominent on the left.  There are postsurgical changes with spinal stabilization rods in the bilateral posterior thoracic spine extending caudally out of the field of view.     1. Right greater left hypoaeration with bandlike opacities in the right lung base that most resembles subsegmental/discoid atelectasis versus pleuroparenchymal scarring. 2. Prominent of left-side of aortic arch may be due to ectatic aorta accentuated by rotation to the  right, but the thoracic aortic aneurysm cannot be excluded.  This can be further evaluated with a CT angiogram of the chest. Electronically signed by: Christophe Barnett MD Date:    03/08/2024 Time:    10:20    US Abdomen Limited_Liver    Result Date: 3/8/2024  RIGHT UPPER QUADRANT ULTRASOUND: CPT: 69554 HISTORY: Elevated liver function tests. Comparison: None. FINDINGS: Multiple transverse and sagittal grayscale sonographic images were acquired through the abdomen. Artifact from bowel gas and body habitus limits this exam with the pancreas mostly obscured and the liver partially obscured.  The visualized portion of the liver measures 15.1 cm in the craniocaudal midclavicular line.  The visualized liver is diffusely increased in echotexture. This increased echotexture decreases sensitivity to detect focal lesions due to decreased penetration. No focal lesions are identified in the partially visualized liver parenchyma.  The main portal vein is patent with hepatopetal flow.  There is sludge in the gallbladder, and there is gallbladder wall thickening to 0.35 cm.  There is no visible cholelithiasis, pericholecystic fluid, or sonographic 's sign. The common bile duct is unremarkable and measures 0.49 cm in its greatest AP diameter.  No images were obtained of the pancreas or right kidney.     1. Sludge in gallbladder with gallbladder wall thickening.  Cholecystitis cannot be excluded. 2. Hepatic steatosis. Electronically signed by: Christophe Barnett MD Date:    03/08/2024 Time:    09:10    CT Head With Contrast    Result Date: 3/7/2024  EXAMINATION: CT HEAD WITH CONTRAST CLINICAL HISTORY: Altered mental status, unspecified TECHNIQUE: Low dose axial CT images obtained throughout the head following the administration of 50 mL Omnipaque 300 intravenous contrast. Sagittal and coronal reconstructions were performed. COMPARISON: None. FINDINGS: Examination degraded by patient motion artifact. Intracranial compartment:  Ventricles and sulci are normal in size for age without evidence of hydrocephalus. No extra-axial blood or fluid collections. No acute parenchymal hemorrhage or evolving major vascular distribution infarct identified.  No intracranial enhancing mass, mass effect or midline shift. Skull/extracranial contents (limited evaluation): No fracture. Mastoid air cells and paranasal sinuses are essentially clear.     No acute intracranial abnormality identified. Electronically signed by: Edgar Allen Date:    03/07/2024 Time:    15:51      N/A         Discharge time: 33 minutes         Pradip Matthew MD  [facility]

## 2025-05-12 NOTE — NURSING
----- Message from Nitza sent at 5/9/2025  2:56 PM CDT -----  Type:  Appointment RequestCaller is requesting a appointment. Name of Caller:pt When is the first available appointment?not seen Would the patient rather a call back or a response via [facility]? Please call to set up Best Call Back Number:[phone] Additional Information: pt will only see Dr Whelan, pt is [employer]     Please call back to advise. Thanks!   Phoned [facility] at [phone], to request transfer for endocrinology and neurology services.  Spoke to House Supervisor.  No endocrinology services available.